# Patient Record
Sex: FEMALE | Employment: UNEMPLOYED | URBAN - NONMETROPOLITAN AREA
[De-identification: names, ages, dates, MRNs, and addresses within clinical notes are randomized per-mention and may not be internally consistent; named-entity substitution may affect disease eponyms.]

---

## 2017-05-03 ENCOUNTER — TELEPHONE (OUTPATIENT)
Dept: PULMONOLOGY | Age: 57
End: 2017-05-03

## 2017-10-17 ENCOUNTER — TELEPHONE (OUTPATIENT)
Dept: PULMONOLOGY | Age: 57
End: 2017-10-17

## 2018-03-29 ENCOUNTER — OFFICE VISIT (OUTPATIENT)
Dept: PULMONOLOGY | Age: 58
End: 2018-03-29
Payer: COMMERCIAL

## 2018-03-29 VITALS
TEMPERATURE: 99.4 F | WEIGHT: 265.4 LBS | HEIGHT: 64 IN | HEART RATE: 88 BPM | SYSTOLIC BLOOD PRESSURE: 138 MMHG | DIASTOLIC BLOOD PRESSURE: 80 MMHG | BODY MASS INDEX: 45.31 KG/M2 | OXYGEN SATURATION: 97 %

## 2018-03-29 DIAGNOSIS — G47.33 OSA ON CPAP: ICD-10-CM

## 2018-03-29 DIAGNOSIS — J30.89 NON-SEASONAL ALLERGIC RHINITIS DUE TO OTHER ALLERGIC TRIGGER, UNSPECIFIED CHRONICITY: Primary | ICD-10-CM

## 2018-03-29 DIAGNOSIS — Z99.89 OSA ON CPAP: ICD-10-CM

## 2018-03-29 PROCEDURE — 3014F SCREEN MAMMO DOC REV: CPT | Performed by: INTERNAL MEDICINE

## 2018-03-29 PROCEDURE — G8484 FLU IMMUNIZE NO ADMIN: HCPCS | Performed by: INTERNAL MEDICINE

## 2018-03-29 PROCEDURE — 1036F TOBACCO NON-USER: CPT | Performed by: INTERNAL MEDICINE

## 2018-03-29 PROCEDURE — 99214 OFFICE O/P EST MOD 30 MIN: CPT | Performed by: INTERNAL MEDICINE

## 2018-03-29 PROCEDURE — G8427 DOCREV CUR MEDS BY ELIG CLIN: HCPCS | Performed by: INTERNAL MEDICINE

## 2018-03-29 PROCEDURE — 3017F COLORECTAL CA SCREEN DOC REV: CPT | Performed by: INTERNAL MEDICINE

## 2018-03-29 PROCEDURE — G8419 CALC BMI OUT NRM PARAM NOF/U: HCPCS | Performed by: INTERNAL MEDICINE

## 2018-03-29 RX ORDER — FLUTICASONE PROPIONATE 50 MCG
2 SPRAY, SUSPENSION (ML) NASAL DAILY
Qty: 1 BOTTLE | Refills: 8 | Status: SHIPPED | OUTPATIENT
Start: 2018-03-29 | End: 2019-03-29

## 2018-03-29 NOTE — PROGRESS NOTES
 JOINT REPLACEMENT      x 2    TUBAL LIGATION      UPPER GASTROINTESTINAL ENDOSCOPY      2014       Social History   Substance Use Topics    Smoking status: Never Smoker    Smokeless tobacco: Never Used    Alcohol use No       No Known Allergies    Current Outpatient Prescriptions   Medication Sig Dispense Refill    HYDROCHLOROTHIAZIDE PO Take by mouth      NAPROXEN PO Take by mouth as needed      lisinopril (PRINIVIL;ZESTRIL) 40 MG tablet Take 40 mg by mouth daily      buPROPion (WELLBUTRIN) 75 MG tablet Take 75 mg by mouth 2 times daily      LORazepam (ATIVAN) 0.5 MG tablet Take 0.5 mg by mouth every 6 hours as needed for Anxiety      oxybutynin (DITROPAN) 5 MG tablet Take 5 mg by mouth 2 times daily      fluticasone (FLONASE) 50 MCG/ACT nasal spray 2 sprays by Nasal route daily 1 Bottle 6    etanercept (ENBREL) 50 MG/ML injection Inject 50 mg into the skin once Once a week      atenolol (TENORMIN) 25 MG tablet Take 25 mg by mouth 2 times daily.  montelukast (SINGULAIR) 10 MG tablet Take 10 mg by mouth nightly.  hydroxychloroquine (PLAQUENIL) 200 MG tablet Take 200 mg by mouth daily.  Cholecalciferol (VITAMIN D3) 08602 UNITS CAPS Take by mouth Once aweek      citalopram (CELEXA) 40 MG tablet Take 40 mg by mouth daily.  POTASSIUM CHLORIDE 10 mEq by Does not apply route 3 times daily.  hydrALAZINE (APRESOLINE) 10 MG tablet Take 100 mg by mouth 2 times daily       ranitidine (ZANTAC) 150 MG capsule Take 150 mg by mouth 2 times daily.  ferrous sulfate 325 (65 FE) MG tablet Take 325 mg by mouth 2 times daily       folic acid (FOLVITE) 1 MG tablet Take 1 mg by mouth daily.  traMADol (ULTRAM) 50 MG tablet Take 50 mg by mouth every 6 hours as needed for Pain      zolpidem (AMBIEN) 10 MG tablet Take 1 tablet by mouth nightly as needed for Sleep 30 tablet 5     No current facility-administered medications for this visit.         Family History   Problem Relation Age of 8hours 53minutes      Interface:      Provider:  []FELIX                 []Saad                [][employer]  [x][facility]                               [][facility]          []Other:          Assesment:  -Severe obstructive sleep apnea on treatment with a CPAP pressure of 19cm H20 on 2LPM of home O2 at night time - under good control with current therapy with significant improvement in clinical symptoms. She had excellent compliance for >4hours.  -Sleep onset and maintenance insomnia-improved. She quit taking [id number] PO Qhs.   -Allergic rhinitis on treatment with Flonase- tolerating well.  -Hx of Acute maxillary sinusitis S/p therapy with Augmentin in the past.  -Rheumatoid arthritis on treatment with Methotrexate. -S/p Left and Right knee replacement. Recommendations/Plan:  -She was advised to continue current positive airway pressure therapy with above described pressure along with home O2.  -Continue patient on Flonase 50mcg/INH 2sprays each nostril daily. She was given refills.  -She advised to keep good compliance with current recommended pressure to get optimal results and clinical improvement.  -She is following with Dr.Barbara Tim MD for her Anxiety disorder.  -Follow with my clinic in 16months for clinical reevaluation with review of download. -She was advised to call [employer] regarding supplies if needed.  -She call my office for earlier appointment if needed for worsening of sleep symptoms.  -She was advised to loose weight by controlling diet and doing exercise once cleared by her family physician.  -Jose Hunter educated about my impression and plan. Patient verbalizes understanding.

## 2020-07-23 ENCOUNTER — TELEPHONE (OUTPATIENT)
Dept: PULMONOLOGY | Age: 60
End: 2020-07-23

## 2021-12-05 NOTE — PROGRESS NOTES
[facility]   Date Of Service: 12/7/2021  Provider: Silvio Garcia DO, DO  Name: Ana Laura Stearns   MRN: [id number]    Chief Complaint(s)      Chief Complaint   Patient presents with    Established New Doctor     NEW PT RA        History of Present illness (HPI)    Ana Laura Stearns   is a(n)61 y.o. female with a hx of Anemia, rheumatoid arthritis, atrial fibrillation with RVR, bladder incontinence, depression, GERD, hypokalemia, hypermobility syndrome,  major depression disorder, migraine, morbid obesity, [place] on CPAP, osteoarthritis, tibial plateau fracture, uncontrolled depression, osteoporosis  referred by Shahriar Lindquist MD for evaluation of rheumatoid arthritis    Rheumatoid arthritis diagnosed in her 30's with increased joint pain and swelling in the feet. Sx's then progressed. Has been treated by Dr. Stefanie Cheng  ---> Dr. Shirin Santoyo at [address] in [place] until 2018. Prior medication  Plaquenil 200mg twice daily, methotrexate , Enbrel ( recurrent UTI, MRSA inection) , tramadol q6 hour, tylenol 3, percocet. Bilateral knee replacement for osteoarthritis - s/p recurrent left knee injection s/p space and then surgical revision. OSTEOARTHRITIS  Of the left hip w/ reported degeneration of the left femoral head and currently unable to weight bear. + deformities of the fingers progressively worsening since 2019. Off all rheumatoid arthritis medication since d/c [facility] home since April 2021. Currently followed by orthopedics, plastic surgery and infectious disease (Dr. Ari Cuevas)  of the wound infection and on minocycline daily. Patient with reported generalized joint pains of the bilateral upper and lower extremities, hips, shoulder. Pain up to 9.5/10 over the past we ek. Active swelling of the bilateral hands, wrists. Defromities of the fingers. Limited ROM of the wrist, elbows, fingers, bilateral shoulder.   Difficulty bathing, brushing teeth, toileting (using bed pan) Constant burning pain along the right lower elg. Current mediation: plaquenil , gabapentin 100mg three time daily,     H/o left leg fracture x 2 - once after jumping out of bet. , Left ankle fracture turning the ankle. On alendroante 70mg daily since Aug 11, 2021. Left femoral fracture. Allergies: Trimethoprim sulfamethoxazole (Bactrim): Hives,    Surgical history: Colonoscopy in 2014, hysterectomy 2010, right leg abscess I&D in 2007, laparoscopic appendectomy 2011, bilateral knee replacement, left knee revision 21, left femoral fracture repair in 2021 cholecystectomy, appendectomy    Family history: Brother pacemaker, father: Hypertension, prostate cancer, cardiac CAD, maternal grandmother: Colon cancer, mother: Arthritis, pacemaker, hypertension, CVA, cardiac CAD    Review of Systems    Review of Systems   Constitutional: Positive for fatigue. Negative for diaphoresis and fever. HENT: Negative for congestion, hearing loss and nosebleeds. Eyes: Negative. Negative for photophobia, pain and redness. Respiratory: Positive for cough (w/ occasional choking while in bed.). Negative for shortness of breath and wheezing. Cardiovascular: Positive for leg swelling. Negative for chest pain. Gastrointestinal: Negative for constipation, diarrhea, nausea and vomiting. Genitourinary: Negative for difficulty urinating, frequency and hematuria. Musculoskeletal: Positive for arthralgias and myalgias. Skin: Negative for rash. Neurological: Positive for weakness. Negative for dizziness and headaches. Hematological: Does not bruise/bleed easily. Psychiatric/Behavioral: Negative for sleep disturbance. The patient is not nervous/anxious. PAST MEDICAL HISTORY     has a past medical history of Allergic rhinitis, Anxiety, Arthritis, Cataract, Depression, Hypertension, Insomnia, [place] (obstructive sleep apnea), Osteoarthritis, Osteoporosis, and Urinary incontinence.      PAST SURGICAL HISTORY     has a past surgical history that includes joint replacement; Cholecystectomy; cyst removal (1992); Tubal ligation; joint replacement; Appendectomy; Ankle fracture surgery; Colonoscopy; and Upper gastrointestinal endoscopy. FAMILY HISTORY      Family History   Problem Relation Age of Onset    High Blood Pressure Father     Prostate Cancer Father     Colon Cancer Maternal Grandmother     Colon Polyps Maternal Grandmother     Other Brother         agent orange       SOCIAL HISTORY     reports that she has never smoked. She has never used smokeless tobacco. She reports that she does not drink alcohol and does not use drugs. ALLERGIES   No Known Allergies    CURRENT MEDICATIONS      Current Outpatient Medications:     HYDROCHLOROTHIAZIDE PO, Take by mouth, Disp: , Rfl:     fluticasone (FLONASE) 50 MCG/ACT nasal spray, 2 sprays by Nasal route daily, Disp: 1 Bottle, Rfl: 8    NAPROXEN PO, Take by mouth as needed, Disp: , Rfl:     traMADol (ULTRAM) 50 MG tablet, Take 50 mg by mouth every 6 hours as needed for Pain, Disp: , Rfl:     lisinopril (PRINIVIL;ZESTRIL) 40 MG tablet, Take 40 mg by mouth daily, Disp: , Rfl:     buPROPion (WELLBUTRIN) 75 MG tablet, Take 75 mg by mouth 2 times daily, Disp: , Rfl:     LORazepam (ATIVAN) 0.5 MG tablet, Take 0.5 mg by mouth every 6 hours as needed for Anxiety, Disp: , Rfl:     oxybutynin (DITROPAN) 5 MG tablet, Take 5 mg by mouth 2 times daily, Disp: , Rfl:     etanercept (ENBREL) 50 MG/ML injection, Inject 50 mg into the skin once Once a week, Disp: , Rfl:     atenolol (TENORMIN) 25 MG tablet, Take 25 mg by mouth 2 times daily. , Disp: , Rfl:     montelukast (SINGULAIR) 10 MG tablet, Take 10 mg by mouth nightly., Disp: , Rfl:     hydroxychloroquine (PLAQUENIL) 200 MG tablet, Take 200 mg by mouth daily. , Disp: , Rfl:     Cholecalciferol (VITAMIN D3) 66587 UNITS CAPS, Take by mouth Once aweek, Disp: , Rfl:     citalopram (CELEXA) 40 MG tablet, Take 40 mg by mouth daily., Disp: , Rfl:     POTASSIUM CHLORIDE, 10 mEq by Does not apply route 3 times daily. , Disp: , Rfl:     hydrALAZINE (APRESOLINE) 10 MG tablet, Take 100 mg by mouth 2 times daily , Disp: , Rfl:     ranitidine (ZANTAC) 150 MG capsule, Take 150 mg by mouth 2 times daily. , Disp: , Rfl:     ferrous sulfate 325 (65 FE) MG tablet, Take 325 mg by mouth 2 times daily , Disp: , Rfl:     folic acid (FOLVITE) 1 MG tablet, Take 1 mg by mouth daily. , Disp: , Rfl:     PHYSICAL EXAMINATION / OBJECTIVE     Objective:  /82 (Site: Left Lower Arm, Position: Sitting, Cuff Size: Medium Adult)   Pulse 110   Ht 5' 4.02\" (1.626 m)   Wt 260 lb (117.9 kg)   SpO2 98%   BMI 44.61 kg/m²     General Appearance:   alert and oriented to person, place and time well-developed and well nourished  Physch : appropriate affects ,   Head:  Normocephalic and atraumatic  Eyes: No gross abnormalities. ,  PERRL, Sclera nonicteric, conjunctiva non-INJECTED  ENT:  MMM,  no deformities , NO oral/nasal sores, Non-tender sinuses. Neck:  Neck supple, Non-tender, No  mass, thyromegaly,    Lymph:  No cervical  or  supraclavicular lymph node swelling. Pulmonary/Chest:  CTA bilat. , normal air movement, no respiratory distress  Cardiovascular:  Normal rate, + S1 and S2,  NO murmurs , rubs, gallups,      edema  :  Deferred   Abd/GI: Deferred   Neurologic:  gait and coordination normal and speech normal  Skin:  banadaged wound along the left knee. - scabbed lesions along the right anterio knee, right medial calf. Right anterior check. - hypertrophic changes w/  skin peeling along the soles of the feet,   Extremities:  No clubbing ,     Musculoskeletal:  Limited AROM - bilat shoulders, Wrists, fingers. Limited PROM Right wrist.    Strength 4/5 . Upper extremities:    SHOULDERS - inability to abduct, flexion, bilat shoulders. + tender bilat.  + swelling/warmth - Right anterior shoulder  ELBOWS  - tender bilat,   WRISTS  - tender / swelling bilat. + limited ROm of the Right wrist. ,   HANDS/FINGERS  Tender- MCPs, PIPs bilat. + synovitis of the MCPs, PIPs. + swan neck changes, ulnar deviation, MCP subluxation. Lower extremities:  ANKLES tender / swelling bilat. FEET : tender MTPs bilat. Swelling Right MTPs. Spine:  C-spine, T-spine & L-spine:  Limited ROM of the cervical spine, side bending. KEY:  Tender : T  Swelling: S  Non-tender : NT  No swelling: NS       RAPID 3  -- Composite Score MDHAQ (0-10) + Patient pain VAS (0-10): + Patient global assessment VAS (0-10):       Remission: <3  Low Disease Activity: <6  Moderate Disease Activity: >=6 and <=12  High Disease Activity: >12    LABS        CBC  No results found for: WBC, RBC, HGB, HCT, MCV, MCH, MCHC, RDW, PLT    CMP  No results found for: CALCIUM, LABALBU, PROT, NA, K, CO2, CL, BUN, CREATININE, ALKPHOS, ALT, AST    HgBA1c: No components found for: HGBA1C    No results found for: TSHFT4, TSH  No results found for: VITD25      No results found for: ANASCRN  No results found for: SSA  No results found for: SSB  No results found for: ANTI-SMITH  No results found for: DSDNAAB   No results found for: ANTIRNP  No results found for: C3, C4  No results found for: CCPAB  No results found for: RF    No components found for: CANCASCRN, APANCASCRN  No results found for: SEDRATE  No results found for: CRP    RADIOLOGY:     ELECTRODIAGNOSTIC CONSULTATION 2018     Alanis Kruse : 1960    Assessment   ASSESSMENT     Diagnoses  1. Tarsal tunnel syndrome, right   2. Tarsal tunnel syndrome, left   3. Mixed sensory-motor polyneuropathy     Discussion of Electrodiagnostic Findings    Needle EMG and the routine NCVs were performed in both legs. There was no evidence of a myopathy. There was no evidence of other radiculopathy or plexopathy. There was evidence of a mild sensory more than motor polyneuropathy.      There is a mild bilateral tarsal tunnel syndrome, involving the lateral plantar nerves. There was no evidence of other isolated nerve injury. Outside imaging:  Left femur: 6/25/2021: Advanced osteoarthritis left hip    DEXA: 6/8/2021 ---    Lumbar spine L1-4: BMD: 0.876 g/cm², T score: -1.6  Right hip (total) BMD: 0.567 g/cm², T score: -2.1  Right hip (neck), BMD: 0.414, T score: -3.9    Becky Guerrero MD - 09/12/2021   Formatting of this note might be different from the original.   9/11/2021 7:47 PM     EXAM: CT chest PE protocol. CLINICAL HISTORY: Chest pain, acute DVT. COMPARISON: CXR 03/14/21. TECHNIQUE:  Helical CT images of the chest were obtained at 2.5 mm collimation during the intravenous administration of 100 cc Isovue 370 contrast material. The timing of contrast is optimized for evaluation of the pulmonary arteries. Coronal MIPS and sagittal 2-D reformations are supplemented. Images are reviewed in both soft tissue and lung window settings. FINDINGS: Examination is technically adequate and diagnostic and there is no definite evidence of acute pulmonary embolus. The primary pulmonary segment is dilated and 4 cm, consistent with pulmonary arterial hypertension. There is some artifact involving lower lobe vessels. The thoracic aorta shows little atherosclerosis. Additionally, there is an anterior right subclavian artery takeoff from the thoracic aortic arch which has a retroesophageal course and no evidence of stenosis. The common carotid vessels arise from a common trunk from the thoracic aortic arch and the left subclavian artery arises from the arch. There is three-vessel coronary artery atherosclerosis. There is no pericardial or pleural effusion. There is an azygos fissure. There is little atelectasis or scar at the right lung base. Elsewhere, the lungs are clear. IMPRESSION:     1. No evidence of acute pulmonary embolus. 2. Pulmonary nodule hypertension. 3. Little atelectasis or scar at the right lung base. Carmen Burk MD - 03/26/2021   Formatting of this note might be different from the original.       XR-HIP LEFT 1 VIEW W/ PELVIS     Date Of Service: 3/26/2021 11:51 AM     Reason for study: AP Pelvis and Xtable lateral left hip please, History:  post op. Number of Series/Images:  2.     Comparison: None     Findings:   AP pelvis and lateral left hip demonstrate total femoral nail fixation of the proximal left femur with partially visualized lateral plate and screw fixation hardware transfixing previous noted subtrochanteric fracture. No displaced pelvic fracture noted. No lucency to suggest hardware malfunction     IMPRESSION:   1. Interval ORIF of the left femur as above     CT abdomen and pelvis findings:   Liver diffusely low density suggesting steatosis. No focal abnormality in the liver or spleen     Pancreas and adrenal glands within normal limits     Kidneys are symmetric     Abdominal aorta normal caliber and course     Bladder incompletely distended contains a Corral catheter. Uterus normal size     Linear density adjacent to the cecum likely indicates postoperative change from prior appendectomy is normal appendix is not apparent. Ileocecal junction appears within normal limits. No small or large bowel dilatation. No evidence of free air. Small fat-containing umbilical hernia without evidence of internal bowel     Evaluation of bone windows shows moderate right and advanced left osteoarthritis of the hips. There is a fracture of the proximal left femur intertrochanteric as on coronal image 76.  Levoscoliotic curvature of the lumbar spine     CT abdomen pelvis impression:   1.  Hepatic steatosis without definitive evidence of acute abdominal or pelvic abnormality   2.  2. Partial visualization of the known proximal left femoral fracture       X-rays pelvis and left hip: The pelvis itself seems balanced.  The right hip shows overgrowth of the superior lateral acetabular region but the head is otherwise rounded concentrically reduced with 2 to 3 mm of joint space. The left hip shows protrusio with a head migrating medially and deforming the central quadrilateral plate bone.  The head is flattened superiorly and medially.  There is absolutely no joint space in the weightbearing area of the hip joint.  There is subchondral sclerosis and subchondral cysts in the femoral head. Impression: End-stage arthritis of the left hip secondary to protrusio.  Right hip appears normal and the remainder of the pelvis and SI joints and bone density is age-appropriate. TTE: Findings:     ~Left Ventricle: Normal left ventricle size. Moderate left ventricular hypertrophy. Normal global systolic LV function. The ejection fraction is visually estimated to be 65 %. No regional wall motion abnormality noted. Grade 3 diastolic dysfunction (restrictive LV filling pattern).     ~Right Ventricle: Normal right ventricle size.     ~Left Atrium: Mild enlargement of the left atrium. Normal appearing atrial septum.     ~Right Atrium: Mild enlargement of the right atrium. Normal appearing atrial septum.     ~Mitral Valve: Normal mitral valve appearance and function.     ~Aortic Valve: Normal aortic valve appearance and function.     ~Tricuspid Valve: Normal tricuspid valve appearance and function.     ~Pulmonic Valve: Normal pulmonic valve appearance. Pulmonary valve not visualized.     ~Great Vessels: The aorta appears normal. The IVC is not visualized.     ~Pericardium: Trivial pericardial effusion.       Conclusions:     Moderate left ventricular hypertrophy. Normal global systolic LV function. The ejection fraction is visually estimated to be 65 %. Mild enlargement of the left atrium. Mild enlargement of the right atrium. No significant valvular abnormalities.       ASSESSMENT/PLAN      1. H/O rheumatoid arthritis    2. Osteoporosis, unspecified osteoporosis type, unspecified pathological fracture presence    3.  Osteoarthritis of multiple joints, unspecified osteoarthritis type    4. Tarsal tunnel syndrome, unspecified laterality    5. Open wound of left knee, sequela    6. Fatigue, unspecified type      1. H/O rheumatoid arthritis   -Patient with a known history of seropositive rheumatoid arthritis. Outside documentation. Previous therapies include methotrexate 17.5 mg weekly, Plaquenil 200 mg twice daily, Enbrel (recurrent MRSA infections). Off therapy now for the past several months since discharge from nursing home. Examination with diffuse synovitis (MCPs, PIPs, wrists, right shoulder, bilateral ankles), MCP subluxation, swan-neck changes bilateral MCPs,    -Requesting infectious disease/wound care notes prior to initiation of DMARD therapy to ensure there is no active infection at the skin graft site along the left knee. -Baseline TB and hepatitis screening for potential initiation of leflunomide versus methotrexate along with other biologic therapy such as Orencia.    -     CBC Auto Differential; Future  -     Comprehensive Metabolic Panel; Future  -     Sedimentation Rate; Future  -     C-Reactive Protein; Future  -     Hepatitis Panel, Acute; Future  -     Quantiferon TB Gold; Future  -     Rheumatoid Factor; Future  -     Cyclic Citrul Peptide Antibody, IgG; Future  -     Anti SSA; Future  -      Screen with Reflex; Future  -     Anti SSB; Future  -     XR HAND RIGHT (MIN 3 VIEWS); Future  -     XR HAND LEFT (MIN 3 VIEWS); Future  -     XR FOOT RIGHT (MIN 3 VIEWS); Future  -     XR FOOT LEFT (MIN 3 VIEWS); Future  -     XR CHEST (2 VW); Future  2. Osteoporosis, unspecified osteoporosis type, unspecified pathological fracture presence  -Previous femoral neck fracture requiring open reduction internal fixation. Request outside DEXA scan from [facility] in [place]. Continue alendronate 70 mg weekly (August 2021).     3. Osteoarthritis of multiple joints, unspecified osteoarthritis type  Prior to prescribing-any anti-inflammatories would like baseline renal functions. 4. Tarsal tunnel syndrome, unspecified laterality  -Prior EMG/NCV as above with bilateral tarsal tunnel syndrome.-Can be related to underlying rheumatoid arthritis and resultant compressive neuropathy    5. Open wound of left knee, sequela  -Request infectious disease, orthopedics and plastic surgery records. Active wound affecting the left knee and followed by wound care    6. Fatigue, unspecified type  -     CBC Auto Differential; Future  -     Comprehensive Metabolic Panel; Future  -     Sedimentation Rate; Future  -     C-Reactive Protein; Future  -     Hepatitis Panel, Acute; Future  -     Quantiferon TB Gold; Future  -     Rheumatoid Factor; Future  -     Cyclic Citrul Peptide Antibody, IgG; Future  -     Anti SSA; Future  -      Screen with Reflex; Future  -     Anti SSB; Future  -     XR HAND RIGHT (MIN 3 VIEWS); Future  -     XR HAND LEFT (MIN 3 VIEWS); Future  -     XR FOOT RIGHT (MIN 3 VIEWS); Future  -     XR FOOT LEFT (MIN 3 VIEWS); Future  -     XR CHEST (2 VW); Future    7. Lymphopenia/leukopenia  -As the patient is off methotrexate and other DMARD therapy with continued lymphopenia's will obtain baseline serologic evaluation for overlap connective tissue disease such as systemic lupus, Sjogren's syndrome or other. This can be a manifestation of rheumatoid arthritis such as Felty syndrome T-LGL. -Repeat CBC has been ordered  -     ANTI-RNP (KRISTEL AB); Future  -     Monique (KRISTEL) Antibody IgG; Future  -     Anti-DNA Antibody, Double-Stranded; Future    8. History ofsubstance abuse. -UDS per Care Everywhere from march 23, 2021. Revealing opiate and amphetamine use    Return in about 6 weeks (around 1/18/2022).     Electronically signed by Maria Dolores Draper DO on 12/5/2021 at 9:17 AM    New Prescriptions    No medications on file       12/5/2021       The risks and benefits of my recommendations, as well as other treatment options, benefits and side effects were discussed with the patient today. Questions were answered. Thank you for allowing me to participate in the care of this patient. Please call if there are any questions.

## 2021-12-07 ENCOUNTER — OFFICE VISIT (OUTPATIENT)
Dept: RHEUMATOLOGY | Age: 61
End: 2021-12-07
Payer: COMMERCIAL

## 2021-12-07 VITALS
SYSTOLIC BLOOD PRESSURE: 132 MMHG | HEIGHT: 64 IN | WEIGHT: 260 LBS | OXYGEN SATURATION: 98 % | DIASTOLIC BLOOD PRESSURE: 82 MMHG | HEART RATE: 110 BPM | BODY MASS INDEX: 44.39 KG/M2

## 2021-12-07 DIAGNOSIS — G57.50 TARSAL TUNNEL SYNDROME, UNSPECIFIED LATERALITY: ICD-10-CM

## 2021-12-07 DIAGNOSIS — Z87.39 H/O RHEUMATOID ARTHRITIS: Primary | ICD-10-CM

## 2021-12-07 DIAGNOSIS — M81.0 OSTEOPOROSIS, UNSPECIFIED OSTEOPOROSIS TYPE, UNSPECIFIED PATHOLOGICAL FRACTURE PRESENCE: ICD-10-CM

## 2021-12-07 DIAGNOSIS — R53.83 FATIGUE, UNSPECIFIED TYPE: ICD-10-CM

## 2021-12-07 DIAGNOSIS — D72.810 LYMPHOPENIA: ICD-10-CM

## 2021-12-07 DIAGNOSIS — S81.002S OPEN WOUND OF LEFT KNEE, SEQUELA: ICD-10-CM

## 2021-12-07 DIAGNOSIS — M15.9 OSTEOARTHRITIS OF MULTIPLE JOINTS, UNSPECIFIED OSTEOARTHRITIS TYPE: ICD-10-CM

## 2021-12-07 PROBLEM — F41.9 ANXIETY DISORDER, UNSPECIFIED: Status: ACTIVE | Noted: 2020-09-20

## 2021-12-07 PROBLEM — F32.A DEPRESSION: Status: ACTIVE | Noted: 2020-09-20

## 2021-12-07 PROBLEM — I71.9 AORTIC ANEURYSM, DESCENDING (HCC): Status: ACTIVE | Noted: 2021-03-23

## 2021-12-07 PROBLEM — E55.9 VITAMIN D INSUFFICIENCY: Status: ACTIVE | Noted: 2021-03-23

## 2021-12-07 PROBLEM — Z96.653 HISTORY OF BILATERAL KNEE REPLACEMENT: Status: ACTIVE | Noted: 2019-11-04

## 2021-12-07 PROBLEM — G57.52 TARSAL TUNNEL SYNDROME, LEFT: Status: ACTIVE | Noted: 2018-04-09

## 2021-12-07 PROBLEM — G47.34 HYPOXIA, SLEEP RELATED: Status: ACTIVE | Noted: 2021-01-19

## 2021-12-07 PROBLEM — Z96.651 S/P REVISION OF TOTAL KNEE, RIGHT: Status: ACTIVE | Noted: 2020-09-04

## 2021-12-07 PROBLEM — M35.7 BENIGN HYPERMOBILITY SYNDROME: Status: ACTIVE | Noted: 2019-01-15

## 2021-12-07 PROBLEM — G60.8 MIXED SENSORY-MOTOR POLYNEUROPATHY: Status: ACTIVE | Noted: 2018-04-09

## 2021-12-07 PROBLEM — F15.10 AMPHETAMINE ABUSE (HCC): Status: ACTIVE | Noted: 2021-03-23

## 2021-12-07 PROBLEM — N32.81 OAB (OVERACTIVE BLADDER): Status: ACTIVE | Noted: 2020-09-20

## 2021-12-07 PROBLEM — M00.80 BACTERIAL ARTHRITIS (HCC): Status: ACTIVE | Noted: 2020-01-16

## 2021-12-07 PROBLEM — I10 HYPERTENSION, ESSENTIAL: Status: ACTIVE | Noted: 2020-09-20

## 2021-12-07 PROBLEM — K21.9 GASTROESOPHAGEAL REFLUX DISEASE WITHOUT ESOPHAGITIS: Status: ACTIVE | Noted: 2020-09-20

## 2021-12-07 PROBLEM — M06.9 RHEUMATOID ARTHRITIS INVOLVING MULTIPLE JOINTS (HCC): Status: ACTIVE | Noted: 2018-06-14

## 2021-12-07 PROCEDURE — G8417 CALC BMI ABV UP PARAM F/U: HCPCS | Performed by: INTERNAL MEDICINE

## 2021-12-07 PROCEDURE — G8427 DOCREV CUR MEDS BY ELIG CLIN: HCPCS | Performed by: INTERNAL MEDICINE

## 2021-12-07 PROCEDURE — G8484 FLU IMMUNIZE NO ADMIN: HCPCS | Performed by: INTERNAL MEDICINE

## 2021-12-07 PROCEDURE — 99245 OFF/OP CONSLTJ NEW/EST HI 55: CPT | Performed by: INTERNAL MEDICINE

## 2021-12-07 RX ORDER — BUSPIRONE HYDROCHLORIDE 15 MG/1
1 TABLET ORAL DAILY
COMMUNITY

## 2021-12-07 RX ORDER — AMLODIPINE BESYLATE 2.5 MG/1
1 TABLET ORAL DAILY
COMMUNITY
Start: 2021-01-19

## 2021-12-07 RX ORDER — GABAPENTIN 100 MG/1
1 CAPSULE ORAL 2 TIMES DAILY
COMMUNITY
Start: 2021-09-12

## 2021-12-07 ASSESSMENT — ENCOUNTER SYMPTOMS
EYE REDNESS: 0
WHEEZING: 0
COUGH: 1
CONSTIPATION: 0
PHOTOPHOBIA: 0
DIARRHEA: 0
VOMITING: 0
EYES NEGATIVE: 1
SHORTNESS OF BREATH: 0
EYE PAIN: 0
NAUSEA: 0

## 2021-12-10 LAB
ALBUMIN SERPL-MCNC: 2.5 G/DL
ALP BLD-CCNC: 93 U/L
ALT SERPL-CCNC: 12 U/L
ANION GAP SERPL CALCULATED.3IONS-SCNC: 11 MMOL/L
AST SERPL-CCNC: 13 U/L
BASOPHILS ABSOLUTE: 0.02 /ΜL
BASOPHILS RELATIVE PERCENT: 0.6 %
BILIRUB SERPL-MCNC: 0.2 MG/DL (ref 0.1–1.4)
BUN BLDV-MCNC: 14 MG/DL
C-REACTIVE PROTEIN: 9.9
CALCIUM SERPL-MCNC: 8.8 MG/DL
CHLORIDE BLD-SCNC: 108 MMOL/L
CO2: 27 MMOL/L
CREAT SERPL-MCNC: 0.8 MG/DL
EOSINOPHILS ABSOLUTE: 0.21 /ΜL
EOSINOPHILS RELATIVE PERCENT: 5.8 %
GFR CALCULATED: 77
GLUCOSE BLD-MCNC: 99 MG/DL
HCT VFR BLD CALC: 34.9 % (ref 36–46)
HEMOGLOBIN: 10.9 G/DL (ref 12–16)
LYMPHOCYTES ABSOLUTE: 0.58 /ΜL
LYMPHOCYTES RELATIVE PERCENT: 16.1 %
MCH RBC QN AUTO: 29.7 PG
MCHC RBC AUTO-ENTMCNC: 31.2 G/DL
MCV RBC AUTO: 95 FL
MONOCYTES ABSOLUTE: 0.53 /ΜL
MONOCYTES RELATIVE PERCENT: 14.7 %
NEUTROPHILS ABSOLUTE: 2.25 /ΜL
NEUTROPHILS RELATIVE PERCENT: 62.5 %
PDW BLD-RTO: 52.6 %
PLATELET # BLD: 308 K/ΜL
PMV BLD AUTO: 10.7 FL
POTASSIUM SERPL-SCNC: 3.9 MMOL/L
RBC # BLD: 3.67 10^6/ΜL
RHEUMATOID FACTOR: 650
SEDIMENTATION RATE, ERYTHROCYTE: 55
SODIUM BLD-SCNC: 142 MMOL/L
TOTAL PROTEIN: NORMAL
WBC # BLD: 3.6 10^3/ML

## 2022-01-04 DIAGNOSIS — Z87.39 H/O RHEUMATOID ARTHRITIS: ICD-10-CM

## 2022-01-04 DIAGNOSIS — R53.83 FATIGUE, UNSPECIFIED TYPE: ICD-10-CM

## 2022-01-11 ENCOUNTER — TELEPHONE (OUTPATIENT)
Dept: RHEUMATOLOGY | Age: 62
End: 2022-01-11

## 2022-01-12 ENCOUNTER — NURSE ONLY (OUTPATIENT)
Dept: LAB | Age: 62
End: 2022-01-12

## 2022-01-12 ENCOUNTER — TELEPHONE (OUTPATIENT)
Dept: RHEUMATOLOGY | Age: 62
End: 2022-01-12

## 2022-01-12 DIAGNOSIS — R53.83 FATIGUE, UNSPECIFIED TYPE: ICD-10-CM

## 2022-01-12 DIAGNOSIS — Z87.39 H/O RHEUMATOID ARTHRITIS: ICD-10-CM

## 2022-01-12 NOTE — TELEPHONE ENCOUNTER
Notes from Dr. Melissa Blum on 11/2021 with active open wound - in care everywhere. Patient scheduled to be seen today--- will evaluate and discuss.

## 2022-01-12 NOTE — TELEPHONE ENCOUNTER
Spoke to Juan regarding wound care/infection dr. Walsh was a little confused but believes it is Dr Merlinda Laroche

## 2022-01-16 LAB
QUANTI TB GOLD PLUS: NEGATIVE
QUANTI TB1 MINUS NIL: 0.03 IU/ML (ref 0–0.34)
QUANTI TB2 MINUS NIL: 0.04 IU/ML (ref 0–0.34)
QUANTIFERON MITOGEN MINUS NIL: 9.45 IU/ML
QUANTIFERON NIL: 0.04 IU/ML

## 2022-02-07 ENCOUNTER — OFFICE VISIT (OUTPATIENT)
Dept: RHEUMATOLOGY | Age: 62
End: 2022-02-07
Payer: COMMERCIAL

## 2022-02-07 VITALS
OXYGEN SATURATION: 96 % | HEIGHT: 64 IN | WEIGHT: 260 LBS | SYSTOLIC BLOOD PRESSURE: 130 MMHG | DIASTOLIC BLOOD PRESSURE: 80 MMHG | BODY MASS INDEX: 44.39 KG/M2 | HEART RATE: 74 BPM

## 2022-02-07 DIAGNOSIS — M05.9 SEROPOSITIVE RHEUMATOID ARTHRITIS (HCC): Primary | ICD-10-CM

## 2022-02-07 DIAGNOSIS — S81.002S OPEN WOUND OF LEFT KNEE, SEQUELA: ICD-10-CM

## 2022-02-07 DIAGNOSIS — M81.0 OSTEOPOROSIS, UNSPECIFIED OSTEOPOROSIS TYPE, UNSPECIFIED PATHOLOGICAL FRACTURE PRESENCE: ICD-10-CM

## 2022-02-07 DIAGNOSIS — M15.9 OSTEOARTHRITIS OF MULTIPLE JOINTS, UNSPECIFIED OSTEOARTHRITIS TYPE: ICD-10-CM

## 2022-02-07 DIAGNOSIS — G57.50 TARSAL TUNNEL SYNDROME, UNSPECIFIED LATERALITY: ICD-10-CM

## 2022-02-07 PROCEDURE — 99214 OFFICE O/P EST MOD 30 MIN: CPT | Performed by: NURSE PRACTITIONER

## 2022-02-07 PROCEDURE — G8417 CALC BMI ABV UP PARAM F/U: HCPCS | Performed by: NURSE PRACTITIONER

## 2022-02-07 PROCEDURE — G8427 DOCREV CUR MEDS BY ELIG CLIN: HCPCS | Performed by: NURSE PRACTITIONER

## 2022-02-07 PROCEDURE — 1036F TOBACCO NON-USER: CPT | Performed by: NURSE PRACTITIONER

## 2022-02-07 PROCEDURE — 3017F COLORECTAL CA SCREEN DOC REV: CPT | Performed by: NURSE PRACTITIONER

## 2022-02-07 PROCEDURE — G8484 FLU IMMUNIZE NO ADMIN: HCPCS | Performed by: NURSE PRACTITIONER

## 2022-02-07 ASSESSMENT — ENCOUNTER SYMPTOMS
DIARRHEA: 0
BACK PAIN: 0
ABDOMINAL PAIN: 0
SHORTNESS OF BREATH: 0
TROUBLE SWALLOWING: 0
EYE PAIN: 0
CONSTIPATION: 1
COUGH: 0
NAUSEA: 0
EYE ITCHING: 0

## 2022-02-07 NOTE — PROGRESS NOTES
dizziness, weakness, numbness and headaches. Psychiatric/Behavioral: The patient is not nervous/anxious. PAST MEDICAL HISTORY      Past Medical History:   Diagnosis Date    Allergic rhinitis     Anxiety     Arthritis     Cataract     Depression     Hypertension     Insomnia     [place] (obstructive sleep apnea)     Osteoarthritis     Osteoporosis     Urinary incontinence        PAST SURGICAL HISTORY      Past Surgical History:   Procedure Laterality Date    ANKLE FRACTURE SURGERY      hardware placed    APPENDECTOMY      CHOLECYSTECTOMY      COLONOSCOPY      2014    CYST REMOVAL  1992    JOINT REPLACEMENT      rt    JOINT REPLACEMENT      x 2    TUBAL LIGATION      UPPER GASTROINTESTINAL ENDOSCOPY      2014       FAMILY HISTORY      Family History   Problem Relation Age of Onset    High Blood Pressure Father     Prostate Cancer Father     Colon Cancer Maternal Grandmother     Colon Polyps Maternal Grandmother     Other Brother         agent orange       SOCIAL HISTORY      Social History     Tobacco History     Smoking Status  Never Smoker    Smokeless Tobacco Use  Never Used          Alcohol History     Alcohol Use Status  No          Drug Use     Drug Use Status  No          Sexual Activity     Sexually Active  Not Asked                ALLERGIES     Allergies   Allergen Reactions    Sulfamethoxazole-Trimethoprim Hives       CURRENT MEDICATIONS      Current Outpatient Medications   Medication Sig Dispense Refill    amLODIPine (NORVASC) 2.5 MG tablet Take 1 tablet by mouth daily      busPIRone (BUSPAR) 15 MG tablet Take 1 tablet by mouth daily      gabapentin (NEURONTIN) 100 MG capsule Take 1 capsule by mouth 2 times daily.       methotrexate (RHEUMATREX) 2.5 MG chemo tablet Take 1 tablet by mouth every 7 days (Patient not taking: Reported on 12/7/2021)      fluticasone (FLONASE) 50 MCG/ACT nasal spray 2 sprays by Nasal route daily 1 Bottle 8    buPROPion (WELLBUTRIN) 75 MG tablet Take 75 mg by mouth 2 times daily      oxybutynin (DITROPAN) 5 MG tablet Take 5 mg by mouth 2 times daily      etanercept (ENBREL) 50 MG/ML injection Inject 50 mg into the skin once Once a week (Patient not taking: Reported on 12/7/2021)      atenolol (TENORMIN) 25 MG tablet Take 25 mg by mouth 2 times daily. (Patient not taking: Reported on 12/7/2021)      hydroxychloroquine (PLAQUENIL) 200 MG tablet Take 200 mg by mouth daily.  Cholecalciferol (VITAMIN D3) 30184 UNITS CAPS Take by mouth Once aweek (Patient not taking: Reported on 12/7/2021)      citalopram (CELEXA) 40 MG tablet Take 40 mg by mouth daily. (Patient not taking: Reported on 12/7/2021)      ranitidine (ZANTAC) 150 MG capsule Take 150 mg by mouth 2 times daily. (Patient not taking: Reported on 12/7/2021)      ferrous sulfate 325 (65 FE) MG tablet Take 325 mg by mouth 2 times daily       folic acid (FOLVITE) 1 MG tablet Take 1 mg by mouth daily. No current facility-administered medications for this visit. PHYSICAL EXAMINATION / OBJECTIVE   Objective:  Ht 5' 4.02\" (1.626 m)   BMI 44.61 kg/m²     Physical Exam  Vitals reviewed. Constitutional:       Appearance: She is well-developed. Cardiovascular:      Rate and Rhythm: Normal rate and regular rhythm. Pulmonary:      Effort: Pulmonary effort is normal.      Breath sounds: Normal breath sounds. Musculoskeletal:      Cervical back: Normal range of motion and neck supple. Skin:     General: Skin is warm and dry. Findings: No rash. Neurological:      Mental Status: She is alert and oriented to person, place, and time. Psychiatric:         Thought Content: Thought content normal.       Upper extremities:    SHOULDERS + limited ROM bilat. Tender bilat. + swelling right  ELBOWS tender bilat,   WRISTS tender and swelling bilat,   HANDS/FINGERS tender and swelling bilat MCPs and PIPs.  + swan neck changes, ulnar deviation, MCP subluxation    Lower extremities:  ANKLES tender and swelling bilat   FEET : tender bilat MTPs, + swelling right        LABS    CBC  Lab Results   Component Value Date    WBC 3.60 12/10/2021    RBC 3.67 12/10/2021    HGB 10.9 12/10/2021    HCT 34.9 12/10/2021    MCV 95.0 12/10/2021    MCH 29.7 12/10/2021    MCHC 31.2 12/10/2021    RDW 52.6 12/10/2021     12/10/2021       CMP  Lab Results   Component Value Date    CALCIUM 8.8 12/10/2021    LABALBU 2.5 12/10/2021     12/10/2021    K 3.9 12/10/2021    CO2 27 12/10/2021     12/10/2021    BUN 14 12/10/2021    CREATININE 0.8 12/10/2021    ALKPHOS 93 12/10/2021    ALT 12 12/10/2021    AST 13 12/10/2021       HgBA1c: No components found for: HGBA1C    No results found for: VITD25      No results found for: ANASCRN  No results found for: SSA  No results found for: SSB  No results found for: ANTI-SMITH  No results found for: DSDNAAB   No results found for: ANTIRNP  No results found for: C3, C4  No results found for: CCPAB  Lab Results   Component Value Date     12/10/2021       No components found for: CANCASCRN, APANCASCRN  Lab Results   Component Value Date    SEDRATE 55 12/10/2021     Lab Results   Component Value Date    CRP 9.9 12/10/2021       RADIOLOGY:        DEXA: 6/8/2021 ---    Lumbar spine L1-4: BMD: 0.876 g/cm², T score: -1.6  Right hip (total) BMD: 0.567 g/cm², T score: -2.1  Right hip (neck), BMD: 0.414, T score: -3.9    ASSESSMENT/PLAN    Assessment   Plan     Seropositive rheumatoid arthritis + RF > 650, +   - prior tx: methotrexate 17.5 mg weekly, plaquenil 200 mg BID, enbrel (recurrent MRSA infections). - would like to start methotrexate- requesting clearance from infectious disease and plastic surgeon prior to starting. - left messages with Dr. Xiao Hdez and Dr. Yaneth Overton offices    Osteoporosis  - prior femoral neck fracture. DEXA 6/2021 as above.     - alendronate 70 mg weekly    Osteoarthritis of multiple joints   - continue excedrin PRN    Tarsal tunnel syndrome  - prior EMG- can be related to #1    Open wound of left knee- active    Leukopenia/lymphopenia  - neg CTD evaluation. Can be related to rheumatoid arthritis (felty, T-LGL)    H/o substance abuse  - UDS in care everywhere w/ opiate and amphetamine use      No follow-ups on file. Electronically signed by CAROLINA Parker CNP on 2/7/2022 at 10:27 AM    New Prescriptions    No medications on file       Thank you for allowing me to participate in the care of this patient. Please call if there are any questions.

## 2022-02-10 ENCOUNTER — TELEPHONE (OUTPATIENT)
Dept: RHEUMATOLOGY | Age: 62
End: 2022-02-10

## 2022-02-10 DIAGNOSIS — M05.9 SEROPOSITIVE RHEUMATOID ARTHRITIS (HCC): Primary | ICD-10-CM

## 2022-02-10 DIAGNOSIS — Z51.81 MEDICATION MONITORING ENCOUNTER: ICD-10-CM

## 2022-02-10 DIAGNOSIS — M05.9 SEROPOSITIVE RHEUMATOID ARTHRITIS (HCC): ICD-10-CM

## 2022-02-10 RX ORDER — FOLIC ACID 1 MG/1
1 TABLET ORAL DAILY
Qty: 90 TABLET | Refills: 1 | Status: SHIPPED | OUTPATIENT
Start: 2022-02-10

## 2022-02-10 NOTE — TELEPHONE ENCOUNTER
Diagnosis Orders   1. Seropositive rheumatoid arthritis (HCC)  methotrexate (RHEUMATREX) 2.5 MG chemo tablet    folic acid (FOLVITE) 1 MG tablet   2. Medication monitoring encounter  CBC Auto Differential    Comprehensive Metabolic Panel    C-Reactive Protein    Sedimentation Rate     - would like to restart Methotrexate 70.2WW weekly with folic acid 1mg daily   - repeat labs every 4 weeks.

## 2022-02-10 NOTE — PROGRESS NOTES
1. Seropositive rheumatoid arthritis (HCC)  - methotrexate (RHEUMATREX) 2.5 MG chemo tablet; Take 7 tablets by mouth once a week Take 4 tablets in the morning and 3 tablets in the evening  Dispense: 28 tablet;  Refill: 0

## 2022-03-24 ENCOUNTER — NURSE ONLY (OUTPATIENT)
Dept: LAB | Age: 62
End: 2022-03-24

## 2022-03-24 ENCOUNTER — OFFICE VISIT (OUTPATIENT)
Dept: RHEUMATOLOGY | Age: 62
End: 2022-03-24
Payer: COMMERCIAL

## 2022-03-24 VITALS
HEART RATE: 82 BPM | HEIGHT: 64 IN | OXYGEN SATURATION: 93 % | WEIGHT: 260 LBS | SYSTOLIC BLOOD PRESSURE: 118 MMHG | BODY MASS INDEX: 44.39 KG/M2 | DIASTOLIC BLOOD PRESSURE: 78 MMHG

## 2022-03-24 DIAGNOSIS — M05.9 SEROPOSITIVE RHEUMATOID ARTHRITIS (HCC): ICD-10-CM

## 2022-03-24 DIAGNOSIS — M81.0 OSTEOPOROSIS, UNSPECIFIED OSTEOPOROSIS TYPE, UNSPECIFIED PATHOLOGICAL FRACTURE PRESENCE: ICD-10-CM

## 2022-03-24 DIAGNOSIS — D72.810 LYMPHOPENIA: ICD-10-CM

## 2022-03-24 DIAGNOSIS — Z51.81 MEDICATION MONITORING ENCOUNTER: ICD-10-CM

## 2022-03-24 DIAGNOSIS — G57.50 TARSAL TUNNEL SYNDROME, UNSPECIFIED LATERALITY: ICD-10-CM

## 2022-03-24 DIAGNOSIS — S81.002S OPEN WOUND OF LEFT KNEE, SEQUELA: ICD-10-CM

## 2022-03-24 DIAGNOSIS — M05.9 SEROPOSITIVE RHEUMATOID ARTHRITIS (HCC): Primary | ICD-10-CM

## 2022-03-24 DIAGNOSIS — M15.9 OSTEOARTHRITIS OF MULTIPLE JOINTS, UNSPECIFIED OSTEOARTHRITIS TYPE: ICD-10-CM

## 2022-03-24 LAB
ALBUMIN SERPL-MCNC: 3.7 G/DL (ref 3.5–5.1)
ALP BLD-CCNC: 106 U/L (ref 38–126)
ALT SERPL-CCNC: 7 U/L (ref 11–66)
ANION GAP SERPL CALCULATED.3IONS-SCNC: 13 MEQ/L (ref 8–16)
AST SERPL-CCNC: 10 U/L (ref 5–40)
BASOPHILS # BLD: 0.7 %
BASOPHILS ABSOLUTE: 0 THOU/MM3 (ref 0–0.1)
BILIRUB SERPL-MCNC: 0.4 MG/DL (ref 0.3–1.2)
BUN BLDV-MCNC: 20 MG/DL (ref 7–22)
C-REACTIVE PROTEIN: 1.52 MG/DL (ref 0–1)
CALCIUM SERPL-MCNC: 10 MG/DL (ref 8.5–10.5)
CHLORIDE BLD-SCNC: 105 MEQ/L (ref 98–111)
CO2: 24 MEQ/L (ref 23–33)
CREAT SERPL-MCNC: 0.8 MG/DL (ref 0.4–1.2)
EOSINOPHIL # BLD: 3.9 %
EOSINOPHILS ABSOLUTE: 0.2 THOU/MM3 (ref 0–0.4)
ERYTHROCYTE [DISTWIDTH] IN BLOOD BY AUTOMATED COUNT: 14 % (ref 11.5–14.5)
ERYTHROCYTE [DISTWIDTH] IN BLOOD BY AUTOMATED COUNT: 50.5 FL (ref 35–45)
GFR SERPL CREATININE-BSD FRML MDRD: 72 ML/MIN/1.73M2
GLUCOSE BLD-MCNC: 109 MG/DL (ref 70–108)
HCT VFR BLD CALC: 44.6 % (ref 37–47)
HEMOGLOBIN: 13.9 GM/DL (ref 12–16)
IMMATURE GRANS (ABS): 0.02 THOU/MM3 (ref 0–0.07)
IMMATURE GRANULOCYTES: 0.4 %
LYMPHOCYTES # BLD: 13.2 %
LYMPHOCYTES ABSOLUTE: 0.8 THOU/MM3 (ref 1–4.8)
MCH RBC QN AUTO: 30.5 PG (ref 26–33)
MCHC RBC AUTO-ENTMCNC: 31.2 GM/DL (ref 32.2–35.5)
MCV RBC AUTO: 98 FL (ref 81–99)
MONOCYTES # BLD: 11.8 %
MONOCYTES ABSOLUTE: 0.7 THOU/MM3 (ref 0.4–1.3)
NUCLEATED RED BLOOD CELLS: 0 /100 WBC
PLATELET # BLD: 359 THOU/MM3 (ref 130–400)
PMV BLD AUTO: 10.6 FL (ref 9.4–12.4)
POTASSIUM SERPL-SCNC: 4 MEQ/L (ref 3.5–5.2)
RBC # BLD: 4.55 MILL/MM3 (ref 4.2–5.4)
SEDIMENTATION RATE, ERYTHROCYTE: 66 MM/HR (ref 0–20)
SEG NEUTROPHILS: 70 %
SEGMENTED NEUTROPHILS ABSOLUTE COUNT: 4 THOU/MM3 (ref 1.8–7.7)
SODIUM BLD-SCNC: 142 MEQ/L (ref 135–145)
TOTAL PROTEIN: 7 G/DL (ref 6.1–8)
WBC # BLD: 5.7 THOU/MM3 (ref 4.8–10.8)

## 2022-03-24 PROCEDURE — 99214 OFFICE O/P EST MOD 30 MIN: CPT | Performed by: NURSE PRACTITIONER

## 2022-03-24 PROCEDURE — 3017F COLORECTAL CA SCREEN DOC REV: CPT | Performed by: NURSE PRACTITIONER

## 2022-03-24 PROCEDURE — G8484 FLU IMMUNIZE NO ADMIN: HCPCS | Performed by: NURSE PRACTITIONER

## 2022-03-24 PROCEDURE — G8417 CALC BMI ABV UP PARAM F/U: HCPCS | Performed by: NURSE PRACTITIONER

## 2022-03-24 PROCEDURE — 1036F TOBACCO NON-USER: CPT | Performed by: NURSE PRACTITIONER

## 2022-03-24 PROCEDURE — G8427 DOCREV CUR MEDS BY ELIG CLIN: HCPCS | Performed by: NURSE PRACTITIONER

## 2022-03-24 ASSESSMENT — ENCOUNTER SYMPTOMS
BACK PAIN: 1
CONSTIPATION: 0
SHORTNESS OF BREATH: 0
TROUBLE SWALLOWING: 0
EYE ITCHING: 0
COUGH: 0
NAUSEA: 0
EYE PAIN: 0
ABDOMINAL PAIN: 0
DIARRHEA: 0

## 2022-03-24 NOTE — PROGRESS NOTES
[facility] RHEUMATOLOGY FOLLOW UP NOTE       Date Of Service: 3/24/2022  Provider: CAROLINA Roche - CNP    Name: Husam Olson   MRN: [id number]    Chief Complaint(s)     Chief Complaint   Patient presents with    Follow-up     6 week f/u        History of Present Illness (HPI)     Husam Olson  is a(n)62 y.o. female with a hx of anemia, rheumatoid arthritis, atrial fibrillation with RVR, bladder incontinence, depression, GERD, hypokalemia, hypermobility syndrome, major depression disorder, migraines morbid obesity, [place] on CPAP, osteoarthritis, tibial plateau fracture, osteoporosis  here for the f/u evaluation of rheumatoid arthritis     Interval hx:    - off methotrexate- did not get labs done   - doing therapy which is helping with overall mobility    pain affecting the fingers, wrists, elbows, shoulders, hips, knees, ankles, toes, neck, back  Pain on a scale 0-10: 10/10  Type of pain: constant  Timing: mornings  Aggravating factors: touching the joints  Alleviating factors: excedrin    Associated symptoms:  denies swelling/  Redness/ warmth, + AM stiffness lasting ~ several hours    Continued to follow with Dr. Fallon Robles in [place] (infectious disease) for open wound    REVIEW OF SYSTEMS: (ROS)    Review of Systems   Constitutional: Positive for fatigue. Negative for fever and unexpected weight change. HENT: Negative for congestion and trouble swallowing. Eyes: Negative for pain and itching. Dry eyes   Respiratory: Negative for cough and shortness of breath. Cardiovascular: Negative for chest pain and leg swelling. Gastrointestinal: Negative for abdominal pain, constipation, diarrhea and nausea. Endocrine: Negative for cold intolerance and heat intolerance. Genitourinary: Negative for difficulty urinating, frequency and urgency. Musculoskeletal: Positive for arthralgias, back pain, myalgias and neck pain. Negative for joint swelling. Skin: Positive for wound. Negative for rash. Neurological: Positive for weakness and headaches. Negative for dizziness and numbness. Psychiatric/Behavioral: The patient is nervous/anxious.         PAST MEDICAL HISTORY      Past Medical History:   Diagnosis Date    Allergic rhinitis     Anxiety     Arthritis     Cataract     Depression     Hypertension     Insomnia     [place] (obstructive sleep apnea)     Osteoarthritis     Osteoporosis     Urinary incontinence        PAST SURGICAL HISTORY      Past Surgical History:   Procedure Laterality Date    ANKLE FRACTURE SURGERY      hardware placed    APPENDECTOMY      CHOLECYSTECTOMY      COLONOSCOPY      2014    CYST REMOVAL  1992    JOINT REPLACEMENT      rt    JOINT REPLACEMENT      x 2    TUBAL LIGATION      UPPER GASTROINTESTINAL ENDOSCOPY      2014       FAMILY HISTORY      Family History   Problem Relation Age of Onset    High Blood Pressure Father     Prostate Cancer Father     Colon Cancer Maternal Grandmother     Colon Polyps Maternal Grandmother     Other Brother         agent orange       SOCIAL HISTORY      Social History     Tobacco History     Smoking Status  Never Smoker    Smokeless Tobacco Use  Never Used          Alcohol History     Alcohol Use Status  No          Drug Use     Drug Use Status  No          Sexual Activity     Sexually Active  Not Asked                ALLERGIES     Allergies   Allergen Reactions    Sulfamethoxazole-Trimethoprim Hives       CURRENT MEDICATIONS      Current Outpatient Medications   Medication Sig Dispense Refill    folic acid (FOLVITE) 1 MG tablet Take 1 tablet by mouth daily 90 tablet 1    methotrexate (RHEUMATREX) 2.5 MG chemo tablet Take 7 tablets by mouth once a week Take 4 tablets in the morning and 3 tablets in the evening 28 tablet 0    amLODIPine (NORVASC) 2.5 MG tablet Take 1 tablet by mouth daily      busPIRone (BUSPAR) 15 MG tablet Take 1 tablet by mouth daily      gabapentin (NEURONTIN) 100 MG capsule Take 1 capsule by mouth 2 times daily.  fluticasone (FLONASE) 50 MCG/ACT nasal spray 2 sprays by Nasal route daily 1 Bottle 8    buPROPion (WELLBUTRIN) 75 MG tablet Take 75 mg by mouth 2 times daily      oxybutynin (DITROPAN) 5 MG tablet Take 5 mg by mouth 2 times daily      hydroxychloroquine (PLAQUENIL) 200 MG tablet Take 200 mg by mouth daily.  ferrous sulfate 325 (65 FE) MG tablet Take 325 mg by mouth 2 times daily        No current facility-administered medications for this visit. PHYSICAL EXAMINATION / OBJECTIVE   Objective: There were no vitals taken for this visit. Physical Exam  Vitals reviewed. Constitutional:       Appearance: She is well-developed. Cardiovascular:      Rate and Rhythm: Normal rate and regular rhythm. Pulmonary:      Effort: Pulmonary effort is normal.      Breath sounds: Normal breath sounds. Musculoskeletal:      Cervical back: Normal range of motion and neck supple. Skin:     General: Skin is warm and dry. Findings: No rash. Neurological:      Mental Status: She is alert and oriented to person, place, and time. Psychiatric:         Thought Content: Thought content normal.       Upper extremities:    SHOULDERS + limited ROM bilat. Tender bilat. + swelling right  ELBOWS tender bilat,   WRISTS tender and swelling bilat,   HANDS/FINGERS tender and swelling bilat MCPs and PIPs.  + swan neck changes, ulnar deviation, MCP subluxation    Lower extremities:  ANKLES tender and swelling bilat   FEET : tender bilat MTPs, + swelling right        LABS    CBC  Lab Results   Component Value Date    WBC 3.60 12/10/2021    RBC 3.67 12/10/2021    HGB 10.9 12/10/2021    HCT 34.9 12/10/2021    MCV 95.0 12/10/2021    MCH 29.7 12/10/2021    MCHC 31.2 12/10/2021    RDW 52.6 12/10/2021     12/10/2021       CMP  Lab Results   Component Value Date    CALCIUM 8.8 12/10/2021    LABALBU 2.5 12/10/2021     12/10/2021    K 3.9 12/10/2021    CO2 27 12/10/2021     12/10/2021    BUN 14 12/10/2021    CREATININE 0.8 12/10/2021    ALKPHOS 93 12/10/2021    ALT 12 12/10/2021    AST 13 12/10/2021       HgBA1c: No components found for: HGBA1C    No results found for: VITD25      No results found for: ANASCRN  No results found for: SSA  No results found for: SSB  No results found for: ANTI-SMITH  No results found for: DSDNAAB   No results found for: ANTIRNP  No results found for: C3, C4  No results found for: CCPAB  Lab Results   Component Value Date     12/10/2021       No components found for: CANCASCRN, APANCASCRN  Lab Results   Component Value Date    SEDRATE 55 12/10/2021     Lab Results   Component Value Date    CRP 9.9 12/10/2021       RADIOLOGY:        DEXA: 6/8/2021 ---    Lumbar spine L1-4: BMD: 0.876 g/cm², T score: -1.6  Right hip (total) BMD: 0.567 g/cm², T score: -2.1  Right hip (neck), BMD: 0.414, T score: -3.9    ASSESSMENT/PLAN    Assessment   Plan     Seropositive rheumatoid arthritis + RF > 650, +   - prior tx: methotrexate 17.5 mg weekly, plaquenil 200 mg BID, enbrel (recurrent MRSA infections). -restart methotrexate 17.5 mg PO weekly & folic acid 1 mg daily (restarted 2/10/22)    Osteoporosis  - prior femoral neck fracture. DEXA 6/2021 as above. - alendronate 70 mg weekly    Osteoarthritis of multiple joints   - continue excedrin PRN    Tarsal tunnel syndrome  - prior EMG- can be related to #1    Open wound of left knee- active    Leukopenia/lymphopenia  - neg CTD evaluation. Can be related to rheumatoid arthritis (felty, TTIARA)    H/o substance abuse  - UDS in care everywhere w/ opiate and amphetamine use    Medication monitoring   - CBC, CMP, sed rate, CRP q 4 weeks x3- due now      No follow-ups on file. Electronically signed by CAROLINA Garay - CNP on 3/24/2022 at 10:32 AM    New Prescriptions    No medications on file       Thank you for allowing me to participate in the care of this patient.    Please call if there are any questions.

## 2022-04-14 DIAGNOSIS — M05.9 SEROPOSITIVE RHEUMATOID ARTHRITIS (HCC): ICD-10-CM

## 2022-04-14 RX ORDER — METHOTREXATE 2.5 MG/1
TABLET ORAL
Qty: 28 TABLET | Refills: 0 | OUTPATIENT
Start: 2022-04-14

## 2022-04-21 DIAGNOSIS — M05.9 SEROPOSITIVE RHEUMATOID ARTHRITIS (HCC): ICD-10-CM

## 2022-04-21 RX ORDER — METHOTREXATE 2.5 MG/1
TABLET ORAL
Qty: 28 TABLET | Refills: 0 | OUTPATIENT
Start: 2022-04-21

## 2022-05-04 NOTE — TELEPHONE ENCOUNTER
Morgan Warner (on [facility]), pts friend is calling asking about labs. She said there was issues with her transportation service and getting labs drawn. Morgan Warner asked if she would be okay to wait until the day of her appt and get [facility]. I let her know that the pt may not be able to get her refills without the labs and they are supposed to be done every 4 wks. She voiced understanding and stated that she thinks she's been off the medication anyways.   Please follow up with Morgan Warner to advise  [phone]

## 2022-05-26 ENCOUNTER — NURSE ONLY (OUTPATIENT)
Dept: LAB | Age: 62
End: 2022-05-26

## 2022-05-26 DIAGNOSIS — Z51.81 MEDICATION MONITORING ENCOUNTER: ICD-10-CM

## 2022-05-26 LAB
ALBUMIN SERPL-MCNC: 3.8 G/DL (ref 3.5–5.1)
ALP BLD-CCNC: 123 U/L (ref 38–126)
ALT SERPL-CCNC: < 5 U/L (ref 11–66)
ANION GAP SERPL CALCULATED.3IONS-SCNC: 12 MEQ/L (ref 8–16)
AST SERPL-CCNC: 10 U/L (ref 5–40)
BASOPHILS # BLD: 0.6 %
BASOPHILS ABSOLUTE: 0 THOU/MM3 (ref 0–0.1)
BILIRUB SERPL-MCNC: 0.3 MG/DL (ref 0.3–1.2)
BUN BLDV-MCNC: 23 MG/DL (ref 7–22)
C-REACTIVE PROTEIN: 0.51 MG/DL (ref 0–1)
CALCIUM SERPL-MCNC: 9.6 MG/DL (ref 8.5–10.5)
CHLORIDE BLD-SCNC: 108 MEQ/L (ref 98–111)
CO2: 23 MEQ/L (ref 23–33)
CREAT SERPL-MCNC: 0.8 MG/DL (ref 0.4–1.2)
EOSINOPHIL # BLD: 3.4 %
EOSINOPHILS ABSOLUTE: 0.3 THOU/MM3 (ref 0–0.4)
ERYTHROCYTE [DISTWIDTH] IN BLOOD BY AUTOMATED COUNT: 15 % (ref 11.5–14.5)
ERYTHROCYTE [DISTWIDTH] IN BLOOD BY AUTOMATED COUNT: 56.7 FL (ref 35–45)
GFR SERPL CREATININE-BSD FRML MDRD: 72 ML/MIN/1.73M2
GLUCOSE BLD-MCNC: 133 MG/DL (ref 70–108)
HCT VFR BLD CALC: 40.9 % (ref 37–47)
HEMOGLOBIN: 12.8 GM/DL (ref 12–16)
IMMATURE GRANS (ABS): 0.02 THOU/MM3 (ref 0–0.07)
IMMATURE GRANULOCYTES: 0.2 %
LYMPHOCYTES # BLD: 9.7 %
LYMPHOCYTES ABSOLUTE: 0.8 THOU/MM3 (ref 1–4.8)
MCH RBC QN AUTO: 32.2 PG (ref 26–33)
MCHC RBC AUTO-ENTMCNC: 31.3 GM/DL (ref 32.2–35.5)
MCV RBC AUTO: 102.8 FL (ref 81–99)
MONOCYTES # BLD: 11.2 %
MONOCYTES ABSOLUTE: 0.9 THOU/MM3 (ref 0.4–1.3)
NUCLEATED RED BLOOD CELLS: 0 /100 WBC
PLATELET # BLD: 382 THOU/MM3 (ref 130–400)
PMV BLD AUTO: 10.8 FL (ref 9.4–12.4)
POTASSIUM SERPL-SCNC: 4.7 MEQ/L (ref 3.5–5.2)
RBC # BLD: 3.98 MILL/MM3 (ref 4.2–5.4)
SEDIMENTATION RATE, ERYTHROCYTE: 33 MM/HR (ref 0–20)
SEG NEUTROPHILS: 74.9 %
SEGMENTED NEUTROPHILS ABSOLUTE COUNT: 6.1 THOU/MM3 (ref 1.8–7.7)
SODIUM BLD-SCNC: 143 MEQ/L (ref 135–145)
TOTAL PROTEIN: 6.1 G/DL (ref 6.1–8)
WBC # BLD: 8.2 THOU/MM3 (ref 4.8–10.8)

## 2022-05-27 DIAGNOSIS — M05.9 SEROPOSITIVE RHEUMATOID ARTHRITIS (HCC): ICD-10-CM

## 2022-06-01 DIAGNOSIS — M05.9 SEROPOSITIVE RHEUMATOID ARTHRITIS (HCC): ICD-10-CM

## 2022-06-07 ENCOUNTER — OFFICE VISIT (OUTPATIENT)
Dept: RHEUMATOLOGY | Age: 62
End: 2022-06-07
Payer: COMMERCIAL

## 2022-06-07 VITALS
HEIGHT: 64 IN | HEART RATE: 96 BPM | OXYGEN SATURATION: 94 % | DIASTOLIC BLOOD PRESSURE: 74 MMHG | BODY MASS INDEX: 44.39 KG/M2 | SYSTOLIC BLOOD PRESSURE: 112 MMHG | WEIGHT: 260 LBS

## 2022-06-07 DIAGNOSIS — M05.9 SEROPOSITIVE RHEUMATOID ARTHRITIS (HCC): Primary | ICD-10-CM

## 2022-06-07 DIAGNOSIS — Z51.81 MEDICATION MONITORING ENCOUNTER: ICD-10-CM

## 2022-06-07 DIAGNOSIS — M15.9 OSTEOARTHRITIS OF MULTIPLE JOINTS, UNSPECIFIED OSTEOARTHRITIS TYPE: ICD-10-CM

## 2022-06-07 DIAGNOSIS — M81.0 OSTEOPOROSIS, UNSPECIFIED OSTEOPOROSIS TYPE, UNSPECIFIED PATHOLOGICAL FRACTURE PRESENCE: ICD-10-CM

## 2022-06-07 DIAGNOSIS — S81.002S OPEN WOUND OF LEFT KNEE, SEQUELA: ICD-10-CM

## 2022-06-07 PROCEDURE — G8417 CALC BMI ABV UP PARAM F/U: HCPCS | Performed by: INTERNAL MEDICINE

## 2022-06-07 PROCEDURE — 1036F TOBACCO NON-USER: CPT | Performed by: INTERNAL MEDICINE

## 2022-06-07 PROCEDURE — G8427 DOCREV CUR MEDS BY ELIG CLIN: HCPCS | Performed by: INTERNAL MEDICINE

## 2022-06-07 PROCEDURE — 3017F COLORECTAL CA SCREEN DOC REV: CPT | Performed by: INTERNAL MEDICINE

## 2022-06-07 PROCEDURE — 99215 OFFICE O/P EST HI 40 MIN: CPT | Performed by: INTERNAL MEDICINE

## 2022-06-07 RX ORDER — LEFLUNOMIDE 10 MG/1
10 TABLET ORAL DAILY
Qty: 30 TABLET | Refills: 0 | Status: SHIPPED | OUTPATIENT
Start: 2022-06-07 | End: 2022-06-29 | Stop reason: SDUPTHER

## 2022-06-07 ASSESSMENT — ENCOUNTER SYMPTOMS
SHORTNESS OF BREATH: 0
BACK PAIN: 1
COUGH: 1
DIARRHEA: 0
TROUBLE SWALLOWING: 0
EYE PAIN: 0
NAUSEA: 0
ABDOMINAL PAIN: 0
CONSTIPATION: 0
EYE ITCHING: 0

## 2022-06-07 NOTE — PATIENT INSTRUCTIONS
Patient Education        leflunomide  Pronunciation: le FLOO wild mide  Brand: Cindy  What is the most important information I should know about leflunomide? Do not use leflunomide if you are pregnant, and stop taking this medicine if you think you might be pregnant. Use birth control to prevent pregnancy while you are taking leflunomide, anduntil you complete a \"drug elimination\" procedure. Leflunomide can cause severe or fatal liver damage. Tell your doctor if you have a history of liver disease or if you also use other medicines such as: pain or arthritis medicine (including aspirin, Tylenol, and Advil/Motrin), medicines to treat tuberculosis or other infections, seizure medication, hormonal birth control or hormone replacement therapy, chemotherapy, cholesterol-lowering medicine, heart medication, orblood pressure medicine. Your liver function will need to be tested often, and you may need to stop taking leflunomide based on the results of thesetests. What is leflunomide? Leflunomide affects the immune system and reduces swelling and inflammation inthe body. Leflunomide is used to treat the symptoms of rheumatoid arthritis. Leflunomide may also be used for purposes not listed in this medication guide. What should I discuss with my healthcare provider before taking leflunomide? You should not use this medicine if you are allergic to leflunomide orteriflunomide, or if:   you are pregnant (you will need to have a negative pregnancy test before starting this treatment);   you have severe liver disease; or   you are also using teriflunomide. Do not use leflunomide if you are pregnant or may become pregnant. Avoid getting pregnant until after you stop taking leflunomide and undergo a \"drug elimination\" procedure to help rid your body of this medicine. Stop taking leflunomide and call your doctor right away if you miss a period or think you might be pregnant.   To make sure leflunomide is safe for you, tell your doctor if you have:   a history of liver disease or hepatitis (leflunomide can cause severe liver problems);   a severe or uncontrolled infection;   kidney disease;   nerve problems, such as neuropathy caused by diabetes;   a history of tuberculosis;   a weak immune system or bone marrow disorder; or   if you are using any drugs that weaken your immune system (such as cancer medicine or steroids). Use birth control to prevent pregnancy while you are taking this medicine. After you stop taking leflunomide, continue using birth control until you have received blood tests to make sure the drug has been eliminated from yourbody. Ask your doctor if you should use a barrier form of birth control (condom or diaphragm with spermicide). Using hormonal contraception (birth control pills, injections, implants, skin patches, and vaginal rings) may increase your riskof liver damage while taking leflunomide. It is not known whether leflunomide passes into breast milk or if it could harma nursing baby. You should not breast-feed while using this medicine. How should I take leflunomide? Before you start treatment with leflunomide, your doctor may perform tests tomake sure you do not have tuberculosis or other infections. Follow all directions on your prescription label. Your doctor may occasionally change your dose. Do not use this medicine in larger or smaller amounts or forlonger than recommended. Your blood pressure will need to be checked often. Leflunomide can lower blood cells that help your body fight infections and help your blood to clot. Your blood will need to be tested often. Your leflunomide treatment may be stopped for a short time based on theresults of these tests. Your liver function will also need to be tested often, and you may need to stop taking leflunomide based on the results of thesetests.   After you stop taking leflunomide, you may need to be treated with other medicines to help your body eliminate leflunomide quickly. If you do not undergo this drug elimination procedure, leflunomide could stay in your body for up to 2 years. Follow your doctor's instructions. You will also need to go through this drug elimination procedure if you plan tobecome pregnant after you stop taking leflunomide. Arthritis is often treated with a combination of drugs. Use all medications as directed by your doctor. Read the medication guide or patient instructions provided with each medication. Do not change your doses or medication schedule without your doctor's advice. Store at room temperature away from moisture, heat, and light. What happens if I miss a dose? Take the missed dose as soon as you remember. Skip the missed dose if it is almost time for your next scheduled dose. Do not take extra medicine to make up the missed dose. What happens if I overdose? Seek emergency medical attention or call the Poison Help line at 1-972.391.3597. Overdose symptoms may include diarrhea, stomach pain, pale skin, easy bruisingor bleeding, dark urine, or jaundice (yellowing of the skin or eyes). What should I avoid while taking leflunomide? Avoid being near people who have colds, the flu, or other contagious illnesses. Contact your doctor at once if you develop signs of infection. Do not receive a \"live\" vaccine while using leflunomide, or you could develop a serious infection. Live vaccines include measles, mumps, rubella (MMR), polio, rotavirus, typhoid, yellow fever, varicella(chickenpox), zoster (shingles), and nasal flu (influenza) vaccine. What are the possible side effects of leflunomide? Get emergency medical help if you have signs of an allergic reaction: hives; difficulty breathing; swelling of your face, lips, tongue, or throat.   Call your doctor at once if you have:   signs of infection --sudden weakness or ill feeling, fever, chills, sore throat, mouth sores, red or swollen gums, trouble swallowing;   sudden chest pain or discomfort, wheezing, dry cough, feeling short of breath;   easy bruising, unusual bleeding (nose, mouth, vagina, or rectum), purple or red pinpoint spots under your skin;   numbness, tingling, or burning pain in your hands or feet;   liver problems --nausea, upper stomach pain, itching, tiredness, loss of appetite, dark urine, claudia-colored stools, jaundice (yellowing of the skin or eyes); or   severe skin reaction --fever, sore throat, swelling in your face or tongue, burning in your eyes, skin pain, followed by a red or purple skin rash that spreads (especially in the face or upper body) and causes blistering and peeling. Common side effects may include:   nausea, diarrhea, stomach pain;   headache;   abnormal liver function tests;   thinning hair;   back pain;   weakness;   rash; or   high blood pressure. This is not a complete list of side effects and others may occur. Call your doctor for medical advice about side effects. You may report side effects toFDA at 9-896-RXM-5093. What other drugs will affect leflunomide? Leflunomide can cause severe or fatal liver damage.  This effect is increased when you also use certain other medicines, including:   acetaminophen (Tylenol), aspirin, gout or arthritis medication (including gold injections); an NSAID (non-steroidal anti-inflammatory drug) --ibuprofen (Advil, Motrin), naproxen (Aleve), celecoxib, diclofenac, indomethacin, meloxicam, and others;   an antibiotic, antifungal medicine, or sulfa drug; tuberculosis medicine; antiviral or HIV/AIDS medication; medicine to treat mental illness; seizure medication --carbamazepine, phenytoin, valproic acid, and others;   birth control pills or hormone replacement therapy; anabolic steroids --methyltestosterone, \"performance-enhancing drugs\"; cancer medication; or   cholesterol-lowering medication --Crestor, Lipitor, Vytorin, Zocor, and others; heart or blood pressure medication. This list is not complete and many other drugs can interact with leflunomide. This includes prescription and over-the-counter medicines, vitamins, and herbal products. Give a list of all your medicines to any healthcare provider who treats you. Not all possible interactions are listed in this medication guide. Where can I get more information? Your pharmacist can provide more information about leflunomide. Remember, keep this and all other medicines out of the reach of children, never share your medicines with others, and use this medication only for the indication prescribed. Every effort has been made to ensure that the information provided by Renee Ku [address] is accurate, up-to-date, and complete, but no guarantee is made to that effect. Drug information contained herein may be time sensitive. [facility] information has been compiled for use by healthcare practitioners and consumers in the [place] and therefore [facility] does not warrant that uses outside of the [place] are appropriate, unless specifically indicated otherwise. [facility]'s drug information does not endorse drugs, diagnose patients or recommend therapy. [facility]'s drug information is an informational resource designed to assist licensed healthcare practitioners in caring for their patients and/or to serve consumers viewing this service as a supplement to, and not a substitute for, the expertise, skill, knowledge and judgment of healthcare practitioners. The absence of a warning for a given drug or drug combination in no way should be construed to indicate that the drug or drug combination is safe, effective or appropriate for any given patient. [facility] does not assume any responsibility for any aspect of healthcare administered with the aid of information [facility] provides.  The information contained herein is not intended to cover all possible uses, directions, precautions, warnings, drug interactions, allergic reactions, or adverse effects. If you have questions about the drugs you are taking, check with yourdoctor, nurse or pharmacist.  Copyright 4139-1258 [address]. Version: 9.01. Revision date: 1/16/2017. Care instructions adapted under license by [facility] ([facility]). If you have questions about a medical condition or this instruction, always ask your healthcare professional. [place] [address] any warranty or liability for your use of this information.

## 2022-06-07 NOTE — PROGRESS NOTES
[facility] RHEUMATOLOGY FOLLOW UP NOTE       Date Of Service: 6/7/2022  Provider: Nancy Daily DO    Name: Bear Champagne   MRN: [id number]    Chief Complaint(s)     Chief Complaint   Patient presents with    Follow-up     2 month follow up        History of Present Illness (HPI)     Bear Champagne  is a(n)62 y.o. female with a hx of anemia, rheumatoid arthritis, atrial fibrillation with RVR, bladder incontinence, depression, GERD, hypokalemia, hypermobility syndrome, major depression disorder, migraines morbid obesity, [place] on CPAP, osteoarthritis, tibial plateau fracture, osteoporosis  here for the f/u evaluation of rheumatoid arthritis     Methotrexate 17.5mg q wk, - decreased pain and swelling of the hands, improved mobility of the finger. Ongoing joint pains of the  fingers, wrists, elbows, shoulders, hips, knees, ankles, toes, neck, back  Pain currently 8/10, greatst in the hips, shoulder and fingers. Constant varialbe joint pains. Timing: mornings Alleviating factors: excedrin. Inability to wt bearing, ambulating with electric wheelchart. AM stiffness lasting several hours. Continued joint swelling     Surgical site opening left knee w/ picture on the phone. + occasional blood drainage   -- Continued to follow with Dr. Roland Mae in [place] (infectious disease) for open wound    REVIEW OF SYSTEMS: (ROS)    Review of Systems   Constitutional: Positive for fatigue. Negative for fever and unexpected weight change. HENT: Negative for congestion and trouble swallowing. Eyes: Negative for pain and itching. Dry eyes   Respiratory: Positive for cough (w/ occasional sputum). Negative for shortness of breath. Cardiovascular: Negative for chest pain and leg swelling. Gastrointestinal: Negative for abdominal pain, constipation, diarrhea and nausea. Endocrine: Negative for cold intolerance and heat intolerance. Genitourinary: Negative for difficulty urinating, frequency and urgency. Musculoskeletal: Positive for arthralgias, back pain, myalgias and neck pain. Negative for joint swelling. Skin: Positive for wound. Negative for rash. Neurological: Positive for weakness and headaches. Negative for dizziness and numbness. Psychiatric/Behavioral: The patient is nervous/anxious.         PAST MEDICAL HISTORY      Past Medical History:   Diagnosis Date    Allergic rhinitis     Anxiety     Arthritis     Cataract     Depression     Hypertension     Insomnia     [place] (obstructive sleep apnea)     Osteoarthritis     Osteoporosis     Urinary incontinence        PAST SURGICAL HISTORY      Past Surgical History:   Procedure Laterality Date    ANKLE FRACTURE SURGERY      hardware placed    APPENDECTOMY      CHOLECYSTECTOMY      COLONOSCOPY      2014    CYST REMOVAL  1992    JOINT REPLACEMENT      rt    JOINT REPLACEMENT      x 2    TUBAL LIGATION      UPPER GASTROINTESTINAL ENDOSCOPY      2014       FAMILY HISTORY      Family History   Problem Relation Age of Onset    High Blood Pressure Father     Prostate Cancer Father     Colon Cancer Maternal Grandmother     Colon Polyps Maternal Grandmother     Other Brother         agent orange       SOCIAL HISTORY      Social History     Tobacco History     Smoking Status  Never Smoker    Smokeless Tobacco Use  Never Used          Alcohol History     Alcohol Use Status  No          Drug Use     Drug Use Status  No          Sexual Activity     Sexually Active  Not Asked                ALLERGIES     Allergies   Allergen Reactions    Sulfamethoxazole-Trimethoprim Hives       CURRENT MEDICATIONS      Current Outpatient Medications   Medication Sig Dispense Refill    cyanocobalamin 1000 MCG tablet Take 1,000 mcg by mouth daily      methotrexate (RHEUMATREX) 2.5 MG chemo tablet Take 7 tablets by mouth once a week Take 4 tablets in the morning and 3 tablets in the evening 28 tablet 0    folic acid (FOLVITE) 1 MG tablet Take 1 tablet by mouth daily 90 tablet 1    amLODIPine (NORVASC) 2.5 MG tablet Take 1 tablet by mouth daily      busPIRone (BUSPAR) 15 MG tablet Take 1 tablet by mouth daily      gabapentin (NEURONTIN) 100 MG capsule Take 1 capsule by mouth 2 times daily.  buPROPion (WELLBUTRIN) 75 MG tablet Take 75 mg by mouth 2 times daily      oxybutynin (DITROPAN) 5 MG tablet Take 5 mg by mouth 2 times daily      ferrous sulfate 325 (65 FE) MG tablet Take 325 mg by mouth 2 times daily       fluticasone (FLONASE) 50 MCG/ACT nasal spray 2 sprays by Nasal route daily 1 Bottle 8    hydroxychloroquine (PLAQUENIL) 200 MG tablet Take 200 mg by mouth daily. (Patient not taking: Reported on 6/7/2022)       No current facility-administered medications for this visit. PHYSICAL EXAMINATION / OBJECTIVE   Objective:  /74 (Site: Left Lower Arm, Position: Sitting, Cuff Size: Large Adult)   Pulse 96   Ht 5' 4.02\" (1.626 m)   Wt 260 lb (117.9 kg)   SpO2 94%   BMI 44.61 kg/m²     Physical Exam  Vitals reviewed. Constitutional:       Appearance: She is well-developed. Cardiovascular:      Rate and Rhythm: Normal rate and regular rhythm. Pulmonary:      Effort: Pulmonary effort is normal.      Breath sounds: Normal breath sounds. Musculoskeletal:      Cervical back: Normal range of motion and neck supple. Skin:     General: Skin is warm and dry. Findings: No rash. Neurological:      Mental Status: She is alert and oriented to person, place, and time. Psychiatric:         Thought Content: Thought content normal.       Upper extremities:    SHOULDERS + limited ROM bilat. Tender bilat. + swelling right  ELBOWS tender bilat,   WRISTS tender and swelling bilat,   HANDS/FINGERS tender and swelling bilat MCPs and PIPs. + swan neck changes, ulnar deviation, MCP subluxation bilateral     Lower extremities:  ANKLES tender and swelling bilat   FEET : tender bilat MTPs.         LABS    CBC  Lab Results   Component Value Date    WBC 8.2 05/26/2022    RBC 3.98 05/26/2022    HGB 12.8 05/26/2022    HCT 40.9 05/26/2022    .8 05/26/2022    MCH 32.2 05/26/2022    MCHC 31.3 05/26/2022    RDW 52.6 12/10/2021     05/26/2022       CMP  Lab Results   Component Value Date    CALCIUM 9.6 05/26/2022    LABALBU 3.8 05/26/2022    PROT 6.1 05/26/2022     05/26/2022    K 4.7 05/26/2022    CO2 23 05/26/2022     05/26/2022    BUN 23 05/26/2022    CREATININE 0.8 05/26/2022    ALKPHOS 123 05/26/2022    ALT <5 05/26/2022    AST 10 05/26/2022     Lab Results   Component Value Date     12/10/2021       No components found for: CANCASCRN, APANCASCRN  Lab Results   Component Value Date    SEDRATE 33 (H) 05/26/2022     Lab Results   Component Value Date    CRP 0.51 05/26/2022       RADIOLOGY:        DEXA: 6/8/2021 ---    Lumbar spine L1-4: BMD: 0.876 g/cm², T score: -1.6  Right hip (total) BMD: 0.567 g/cm², T score: -2.1  Right hip (neck), BMD: 0.414, T score: -3.9    ASSESSMENT/PLAN    Assessment   Plan     Seropositive rheumatoid arthritis + RF > 650, +   - prior tx: methotrexate 17.5 mg weekly, plaquenil 200 mg BID, enbrel (recurrent MRSA infections). -- methotrexate 17.5 mg PO weekly & folic acid 1 mg daily (restarted 2/10/22)   -- start arava 10 mg daily    -- would like to start orencia b/c of the active synovitis, + ccp ab, prior recurrent infection with anti-TNF therapy (enbrel) . --- if okay with plastic surgeon and if there is no evidence of infection could puruse       Osteoporosis - prior femoral neck fracture. DEXA 6/2021 as above. - alendronate 70 mg weekly    Osteoarthritis of multiple joints -- continue excedrin PRN    Tarsal tunnel syndrome -- prior EMG- related to #1    Open wound of left knee- active    - scheduled for plastic surgery evaluation Tuesday of next week. 6/14/2022. Leukopenia-- resolved   Lymphopenia -- active but improved. - ? rheumatoid arthritis (felty, T-LGL).  Negative evaluation for systemic lupus or other CTD'    H/o substance abuse  - UDS in care everywhere w/ opiate and amphetamine use  - avoiding pain medications     Medication monitoring   - CBC, CMP, sed rate, CRP q 4 weeks x3 w/ arava start. No follow-ups on file. Electronically signed by Orquidea Coffman DO on 6/7/2022 at 2:25 PM    New Prescriptions    No medications on file       Thank you for allowing me to participate in the care of this patient. Please call if there are any questions.

## 2022-06-16 DIAGNOSIS — M05.9 SEROPOSITIVE RHEUMATOID ARTHRITIS (HCC): ICD-10-CM

## 2022-06-16 RX ORDER — METHOTREXATE 2.5 MG/1
TABLET ORAL
Qty: 28 TABLET | Refills: 0 | OUTPATIENT
Start: 2022-06-16

## 2022-06-28 ENCOUNTER — TELEPHONE (OUTPATIENT)
Dept: RHEUMATOLOGY | Age: 62
End: 2022-06-28

## 2022-06-28 DIAGNOSIS — Z51.81 MEDICATION MONITORING ENCOUNTER: Primary | ICD-10-CM

## 2022-06-28 LAB
ALBUMIN SERPL-MCNC: 3.1 G/DL
ALP BLD-CCNC: 107 U/L
ALT SERPL-CCNC: 19 U/L
ANION GAP SERPL CALCULATED.3IONS-SCNC: 13 MMOL/L
AST SERPL-CCNC: 13 U/L
BASOPHILS ABSOLUTE: 0.02 /ΜL
BASOPHILS RELATIVE PERCENT: 0.4 %
BILIRUB SERPL-MCNC: 0.5 MG/DL (ref 0.1–1.4)
BUN BLDV-MCNC: 24 MG/DL
C-REACTIVE PROTEIN: 5.8
CALCIUM SERPL-MCNC: 9.4 MG/DL
CHLORIDE BLD-SCNC: 107 MMOL/L
CO2: 27 MMOL/L
CREAT SERPL-MCNC: 0.9 MG/DL
EOSINOPHILS ABSOLUTE: 0.23 /ΜL
EOSINOPHILS RELATIVE PERCENT: 4.1 %
GFR CALCULATED: 67
GLUCOSE BLD-MCNC: 111 MG/DL
HCT VFR BLD CALC: 37.4 % (ref 36–46)
HEMOGLOBIN: 12.3 G/DL (ref 12–16)
LYMPHOCYTES ABSOLUTE: 0.64 /ΜL
LYMPHOCYTES RELATIVE PERCENT: 11.4 %
MCH RBC QN AUTO: 31.5 PG
MCHC RBC AUTO-ENTMCNC: 32.9 G/DL
MCV RBC AUTO: 95.7 FL
MONOCYTES ABSOLUTE: 0.26 /ΜL
MONOCYTES RELATIVE PERCENT: 4.7 %
NEUTROPHILS ABSOLUTE: 4.43 /ΜL
NEUTROPHILS RELATIVE PERCENT: 79.2 %
PDW BLD-RTO: 47.8 %
PLATELET # BLD: 382 K/ΜL
PMV BLD AUTO: 10.2 FL
POTASSIUM SERPL-SCNC: 3.9 MMOL/L
RBC # BLD: 3.91 10^6/ΜL
SEDIMENTATION RATE, ERYTHROCYTE: 44
SODIUM BLD-SCNC: 143 MMOL/L
TOTAL PROTEIN: 6.8
WBC # BLD: 5.59 10^3/ML

## 2022-06-29 DIAGNOSIS — M05.9 SEROPOSITIVE RHEUMATOID ARTHRITIS (HCC): ICD-10-CM

## 2022-06-29 RX ORDER — LEFLUNOMIDE 10 MG/1
10 TABLET ORAL DAILY
Qty: 30 TABLET | Refills: 0 | Status: SHIPPED | OUTPATIENT
Start: 2022-06-29 | End: 2022-07-19 | Stop reason: SDUPTHER

## 2022-07-15 DIAGNOSIS — M05.9 SEROPOSITIVE RHEUMATOID ARTHRITIS (HCC): ICD-10-CM

## 2022-07-15 RX ORDER — METHOTREXATE 2.5 MG/1
TABLET ORAL
Qty: 28 TABLET | Refills: 0 | OUTPATIENT
Start: 2022-07-15

## 2022-07-19 ENCOUNTER — OFFICE VISIT (OUTPATIENT)
Dept: RHEUMATOLOGY | Age: 62
End: 2022-07-19
Payer: COMMERCIAL

## 2022-07-19 VITALS
DIASTOLIC BLOOD PRESSURE: 80 MMHG | OXYGEN SATURATION: 96 % | SYSTOLIC BLOOD PRESSURE: 140 MMHG | WEIGHT: 260 LBS | BODY MASS INDEX: 44.61 KG/M2 | HEART RATE: 82 BPM

## 2022-07-19 DIAGNOSIS — M15.9 OSTEOARTHRITIS OF MULTIPLE JOINTS, UNSPECIFIED OSTEOARTHRITIS TYPE: ICD-10-CM

## 2022-07-19 DIAGNOSIS — G57.50 TARSAL TUNNEL SYNDROME, UNSPECIFIED LATERALITY: ICD-10-CM

## 2022-07-19 DIAGNOSIS — M05.9 SEROPOSITIVE RHEUMATOID ARTHRITIS (HCC): Primary | ICD-10-CM

## 2022-07-19 DIAGNOSIS — Z51.81 MEDICATION MONITORING ENCOUNTER: ICD-10-CM

## 2022-07-19 DIAGNOSIS — S81.002S OPEN WOUND OF LEFT KNEE, SEQUELA: ICD-10-CM

## 2022-07-19 DIAGNOSIS — D72.810 LYMPHOPENIA: ICD-10-CM

## 2022-07-19 DIAGNOSIS — M81.0 OSTEOPOROSIS, UNSPECIFIED OSTEOPOROSIS TYPE, UNSPECIFIED PATHOLOGICAL FRACTURE PRESENCE: ICD-10-CM

## 2022-07-19 PROCEDURE — G8417 CALC BMI ABV UP PARAM F/U: HCPCS | Performed by: NURSE PRACTITIONER

## 2022-07-19 PROCEDURE — G8427 DOCREV CUR MEDS BY ELIG CLIN: HCPCS | Performed by: NURSE PRACTITIONER

## 2022-07-19 PROCEDURE — 1036F TOBACCO NON-USER: CPT | Performed by: NURSE PRACTITIONER

## 2022-07-19 PROCEDURE — 99214 OFFICE O/P EST MOD 30 MIN: CPT | Performed by: NURSE PRACTITIONER

## 2022-07-19 PROCEDURE — 3017F COLORECTAL CA SCREEN DOC REV: CPT | Performed by: NURSE PRACTITIONER

## 2022-07-19 RX ORDER — LEFLUNOMIDE 20 MG/1
20 TABLET ORAL DAILY
Qty: 20 TABLET | Refills: 0 | Status: SHIPPED | OUTPATIENT
Start: 2022-07-19

## 2022-07-19 ASSESSMENT — ENCOUNTER SYMPTOMS
DIARRHEA: 0
TROUBLE SWALLOWING: 0
ABDOMINAL PAIN: 0
BACK PAIN: 1
EYE ITCHING: 0
SHORTNESS OF BREATH: 0
CONSTIPATION: 0
NAUSEA: 0
COUGH: 0
EYE PAIN: 0

## 2022-07-19 NOTE — PROGRESS NOTES
[facility] RHEUMATOLOGY FOLLOW UP NOTE       Date Of Service: 7/19/2022  Provider: CAROLINA Aguila - CNP    Name: Charles Edmondson   MRN: [id number]    Chief Complaint(s)     Chief Complaint   Patient presents with    Follow-up     6 wk f/u ESVIN        History of Present Illness (HPI)     Charles Edmondson  is a(n)62 y.o. female with a hx of anemia, rheumatoid arthritis, atrial fibrillation with RVR, bladder incontinence, depression, GERD, hypokalemia, hypermobility syndrome, major depression disorder, migraines morbid obesity, [place] on CPAP, osteoarthritis, tibial plateau fracture, osteoporosis  here for the f/u evaluation of rheumatoid arthritis     Interval hx:    - improvement with arava   - following with Dr. Racquel Bronson wound clinic- plastic surgery- [facility] for open wound on knee    pain affecting the fingers, shoulders, hips  Pain on a scale 0-10: 10/10  Type of pain: constant  Timing: mornings  Aggravating factors: touching the joints  Alleviating factors: excedrin    Associated symptoms:  denies swelling/  Redness/ warmth, + AM stiffness lasting ~ several hours    Continued to follow with Dr. Roxie Dixon in [place] (infectious disease) for open wound    REVIEW OF SYSTEMS: (ROS)    Review of Systems   Constitutional:  Positive for fatigue. Negative for fever and unexpected weight change. HENT:  Negative for congestion and trouble swallowing. Eyes:  Negative for pain and itching. Dry eyes   Respiratory:  Negative for cough and shortness of breath. Cardiovascular:  Negative for chest pain and leg swelling. Gastrointestinal:  Negative for abdominal pain, constipation, diarrhea and nausea. Endocrine: Negative for cold intolerance and heat intolerance. Genitourinary:  Negative for difficulty urinating, frequency and urgency. Musculoskeletal:  Positive for arthralgias, back pain, myalgias and neck pain. Negative for joint swelling. Skin:  Positive for wound.  Negative for rash. Neurological:  Positive for weakness and headaches. Negative for dizziness and numbness. Psychiatric/Behavioral:  The patient is nervous/anxious.       PAST MEDICAL HISTORY      Past Medical History:   Diagnosis Date    Allergic rhinitis     Anxiety     Arthritis     Cataract     Depression     Hypertension     Insomnia     [place] (obstructive sleep apnea)     Osteoarthritis     Osteoporosis     Urinary incontinence        PAST SURGICAL HISTORY      Past Surgical History:   Procedure Laterality Date    ANKLE FRACTURE SURGERY      hardware placed    APPENDECTOMY      CHOLECYSTECTOMY      COLONOSCOPY      2014    CYST REMOVAL  1992    JOINT REPLACEMENT      rt    JOINT REPLACEMENT      x 2    TUBAL LIGATION      UPPER GASTROINTESTINAL ENDOSCOPY      2014       FAMILY HISTORY      Family History   Problem Relation Age of Onset    High Blood Pressure Father     Prostate Cancer Father     Colon Cancer Maternal Grandmother     Colon Polyps Maternal Grandmother     Other Brother         agent orange       SOCIAL HISTORY      Social History       Tobacco History       Smoking Status  Never Smoker      Smokeless Tobacco Use  Never Used              Alcohol History       Alcohol Use Status  No              Drug Use       Drug Use Status  No              Sexual Activity       Sexually Active  Not Asked                    ALLERGIES     Allergies   Allergen Reactions    Sulfamethoxazole-Trimethoprim Hives       CURRENT MEDICATIONS      Current Outpatient Medications   Medication Sig Dispense Refill    leflunomide (ARAVA) 10 MG tablet Take 1 tablet by mouth daily 30 tablet 0    methotrexate (RHEUMATREX) 2.5 MG chemo tablet Take 7 tablets by mouth once a week Take 4 tablets in the morning and 3 tablets in the evening 28 tablet 0    cyanocobalamin 1000 MCG tablet Take 1,000 mcg by mouth daily      folic acid (FOLVITE) 1 MG tablet Take 1 tablet by mouth daily 90 tablet 1    amLODIPine (NORVASC) 2.5 MG tablet Take 1 tablet by mouth daily      busPIRone (BUSPAR) 15 MG tablet Take 1 tablet by mouth daily      gabapentin (NEURONTIN) 100 MG capsule Take 1 capsule by mouth 2 times daily. fluticasone (FLONASE) 50 MCG/ACT nasal spray 2 sprays by Nasal route daily 1 Bottle 8    buPROPion (WELLBUTRIN) 75 MG tablet Take 75 mg by mouth 2 times daily      oxybutynin (DITROPAN) 5 MG tablet Take 5 mg by mouth 2 times daily      hydroxychloroquine (PLAQUENIL) 200 MG tablet Take 200 mg by mouth daily. (Patient not taking: Reported on 6/7/2022)      ferrous sulfate 325 (65 FE) MG tablet Take 325 mg by mouth 2 times daily        No current facility-administered medications for this visit. PHYSICAL EXAMINATION / OBJECTIVE   Objective: There were no vitals taken for this visit. Physical Exam  Vitals reviewed. Constitutional:       Appearance: She is well-developed. Cardiovascular:      Rate and Rhythm: Normal rate and regular rhythm. Pulmonary:      Effort: Pulmonary effort is normal.      Breath sounds: Normal breath sounds. Musculoskeletal:      Cervical back: Normal range of motion and neck supple. Skin:     General: Skin is warm and dry. Findings: No rash. Neurological:      Mental Status: She is alert and oriented to person, place, and time. Psychiatric:         Thought Content: Thought content normal.     Upper extremities:    SHOULDERS + limited ROM bilat. Tender bilat. + swelling right  ELBOWS tender bilat,   WRISTS tender and swelling bilat,   HANDS/FINGERS tender and swelling bilat MCPs and PIPs.  + swan neck changes, ulnar deviation, MCP subluxation    Lower extremities:  ANKLES tender and swelling bilat   FEET : tender bilat MTPs       LABS    CBC  Lab Results   Component Value Date/Time    WBC 5.59 06/28/2022 12:00 AM    RBC 3.91 06/28/2022 12:00 AM    HGB 12.3 06/28/2022 12:00 AM    HCT 37.4 06/28/2022 12:00 AM    MCV 95.7 06/28/2022 12:00 AM    MCH 31.5 06/28/2022 12:00 AM    MCHC 32.9 06/28/2022 12:00 AM RDW 47.8 06/28/2022 12:00 AM     06/28/2022 12:00 AM       CMP  Lab Results   Component Value Date/Time    CALCIUM 9.4 06/28/2022 12:00 AM    LABALBU 3.1 06/28/2022 12:00 AM    PROT 6.1 05/26/2022 10:56 AM     06/28/2022 12:00 AM    K 3.9 06/28/2022 12:00 AM    CO2 27 06/28/2022 12:00 AM     06/28/2022 12:00 AM    BUN 24 06/28/2022 12:00 AM    CREATININE 0.9 06/28/2022 12:00 AM    ALKPHOS 107 06/28/2022 12:00 AM    ALT 19 06/28/2022 12:00 AM    AST 13 06/28/2022 12:00 AM       HgBA1c: No components found for: HGBA1C    No results found for: VITD25      No results found for: ANASCRN  No results found for: SSA  No results found for: SSB  No results found for: ANTI-SMITH  No results found for: DSDNAAB   No results found for: ANTIRNP  No results found for: C3, C4  No results found for: CCPAB  Lab Results   Component Value Date     12/10/2021       No components found for: CANCASCRN, APANCASCRN  Lab Results   Component Value Date    SEDRATE 44 06/28/2022     Lab Results   Component Value Date    CRP 5.8 06/28/2022       RADIOLOGY:        DEXA: 6/8/2021 ---    Lumbar spine L1-4: BMD: 0.876 g/cm², T score: -1.6  Right hip (total) BMD: 0.567 g/cm², T score: -2.1  Right hip (neck), BMD: 0.414, T score: -3.9    ASSESSMENT/PLAN    Assessment   Plan     Seropositive rheumatoid arthritis + RF > 650, +   - prior tx: methotrexate 17.5 mg weekly, plaquenil 200 mg BID, enbrel (recurrent MRSA infections). -methotrexate 17.5 mg PO weekly & folic acid 1 mg daily (restarted 2/10/22)   - increase arava to 20 mg daily (6/2022)   - would like to start orencia- need clearance from plastic surgery given open knee wound    Osteoporosis  - prior femoral neck fracture. DEXA 6/2021 as above.     - alendronate 70 mg weekly    Osteoarthritis of multiple joints   - continue excedrin PRN    Tarsal tunnel syndrome  - prior EMG- can be related to #1    Open wound of left knee- active- following with Dr. Avelino Duenas at [facility] evaluation. Can be related to rheumatoid arthritis (felty, T-LGL)    H/o substance abuse  - UDS in care everywhere w/ opiate and amphetamine use    Medication monitoring   - CBC, CMP, sed rate, CRP q 4 weeks x3      No follow-ups on file. Electronically signed by CAROLINA Hyatt CNP on 7/19/2022 at 11:36 AM    New Prescriptions    No medications on file       Thank you for allowing me to participate in the care of this patient. Please call if there are any questions.

## 2022-07-26 DIAGNOSIS — M05.9 SEROPOSITIVE RHEUMATOID ARTHRITIS (HCC): ICD-10-CM

## 2022-07-26 RX ORDER — METHOTREXATE 2.5 MG/1
TABLET ORAL
Qty: 28 TABLET | Refills: 0 | OUTPATIENT
Start: 2022-07-26

## 2022-07-26 NOTE — TELEPHONE ENCOUNTER
DOLV: 7/19/22  DONV: 9/19/22  LAST LAB DRAW: 6/28/22  LAST TB TEST: na    Lab Results   Component Value Date     06/28/2022    K 3.9 06/28/2022     06/28/2022    CO2 27 06/28/2022    BUN 24 06/28/2022    CREATININE 0.9 06/28/2022    GLUCOSE 111 06/28/2022    CALCIUM 9.4 06/28/2022    PROT 6.1 05/26/2022    LABALBU 3.1 06/28/2022    BILITOT 0.5 06/28/2022    ALKPHOS 107 06/28/2022    AST 13 06/28/2022    ALT 19 06/28/2022    LABGLOM 67 06/28/2022       Recent Labs     06/28/22  0000   WBC 5.59   HGB 12.3   HCT 37.4   MCV 95.7          Lab Results   Component Value Date    SEDRATE 44 06/28/2022       Lab Results   Component Value Date    CRP 5.8 06/28/2022

## 2022-07-27 ENCOUNTER — TELEPHONE (OUTPATIENT)
Dept: RHEUMATOLOGY | Age: 62
End: 2022-07-27

## 2022-07-27 NOTE — TELEPHONE ENCOUNTER
Spoke with pt and states that she had skin grafts on her knees after having MRSA and the grafts had gotten infected but are now healing. Please advise if its okay to start on orencia.

## 2022-07-27 NOTE — TELEPHONE ENCOUNTER
Can you please call Dr. Mary Dent office and see if they are okay for her to start it and verify that the infection has resolved.

## 2022-07-27 NOTE — TELEPHONE ENCOUNTER
Patients friend Cathie Ross (on [facility]) calling in for patient who was seen 7/19/2022. Cathie Ross wanted to let Sergei and Dr Issa Tucker know that Ramsey Grijalva has an appt/phone call with Dr Refugio Carl on 8/2/2022. And Cathie Ross said she asked Dr Avtar Becker, the plastic surgeon, about starting the orencia injections, and he thought that was a good idea. Please contact Dr Fernandez Magallanes office to get the ok, phone number is [phone]. Please advise.

## 2022-07-29 NOTE — TELEPHONE ENCOUNTER
Left message on nurse line at Dr Bonnie Rodriguez office with information and requesting a call back.

## 2022-08-09 DIAGNOSIS — M05.9 SEROPOSITIVE RHEUMATOID ARTHRITIS (HCC): ICD-10-CM

## 2022-08-09 RX ORDER — LEFLUNOMIDE 20 MG/1
TABLET ORAL
Qty: 20 TABLET | Refills: 0 | OUTPATIENT
Start: 2022-08-09

## 2022-08-16 DIAGNOSIS — M05.9 SEROPOSITIVE RHEUMATOID ARTHRITIS (HCC): ICD-10-CM

## 2022-08-16 RX ORDER — LEFLUNOMIDE 20 MG/1
TABLET ORAL
Qty: 20 TABLET | Refills: 0 | OUTPATIENT
Start: 2022-08-16

## 2022-08-16 RX ORDER — METHOTREXATE 2.5 MG/1
TABLET ORAL
Qty: 28 TABLET | Refills: 0 | OUTPATIENT
Start: 2022-08-16

## 2022-09-19 ENCOUNTER — OFFICE VISIT (OUTPATIENT)
Dept: RHEUMATOLOGY | Age: 62
End: 2022-09-19
Payer: COMMERCIAL

## 2022-09-19 VITALS
BODY MASS INDEX: 44.61 KG/M2 | HEIGHT: 64 IN | HEART RATE: 93 BPM | SYSTOLIC BLOOD PRESSURE: 118 MMHG | DIASTOLIC BLOOD PRESSURE: 72 MMHG | OXYGEN SATURATION: 93 %

## 2022-09-19 DIAGNOSIS — Z51.81 MEDICATION MONITORING ENCOUNTER: ICD-10-CM

## 2022-09-19 DIAGNOSIS — D72.810 LYMPHOPENIA: ICD-10-CM

## 2022-09-19 DIAGNOSIS — M81.0 OSTEOPOROSIS, UNSPECIFIED OSTEOPOROSIS TYPE, UNSPECIFIED PATHOLOGICAL FRACTURE PRESENCE: ICD-10-CM

## 2022-09-19 DIAGNOSIS — M05.9 SEROPOSITIVE RHEUMATOID ARTHRITIS (HCC): Primary | ICD-10-CM

## 2022-09-19 DIAGNOSIS — M15.9 OSTEOARTHRITIS OF MULTIPLE JOINTS, UNSPECIFIED OSTEOARTHRITIS TYPE: ICD-10-CM

## 2022-09-19 PROCEDURE — 1036F TOBACCO NON-USER: CPT | Performed by: NURSE PRACTITIONER

## 2022-09-19 PROCEDURE — 99214 OFFICE O/P EST MOD 30 MIN: CPT | Performed by: NURSE PRACTITIONER

## 2022-09-19 PROCEDURE — G8417 CALC BMI ABV UP PARAM F/U: HCPCS | Performed by: NURSE PRACTITIONER

## 2022-09-19 PROCEDURE — G8427 DOCREV CUR MEDS BY ELIG CLIN: HCPCS | Performed by: NURSE PRACTITIONER

## 2022-09-19 PROCEDURE — 3017F COLORECTAL CA SCREEN DOC REV: CPT | Performed by: NURSE PRACTITIONER

## 2022-09-19 RX ORDER — ABATACEPT 125 MG/ML
125 INJECTION, SOLUTION SUBCUTANEOUS
Qty: 4 ML | Refills: 5 | Status: ACTIVE | OUTPATIENT
Start: 2022-09-19

## 2022-09-19 ASSESSMENT — ENCOUNTER SYMPTOMS
COUGH: 0
SHORTNESS OF BREATH: 0
DIARRHEA: 0
EYE ITCHING: 0
NAUSEA: 0
TROUBLE SWALLOWING: 0
BACK PAIN: 1
EYE PAIN: 0
ABDOMINAL PAIN: 0
CONSTIPATION: 0

## 2022-09-19 NOTE — PROGRESS NOTES
[facility] RHEUMATOLOGY FOLLOW UP NOTE       Date Of Service: 9/19/2022  Provider: CAROLINA Tinoco CNP    Name: Oz Ko   MRN: [id number]    Chief Complaint(s)     Chief Complaint   Patient presents with    Follow-up     2 month         History of Present Illness (HPI)     Oz Ko  is a(n)62 y.o. female with a hx of anemia, rheumatoid arthritis, atrial fibrillation with RVR, bladder incontinence, depression, GERD, hypokalemia, hypermobility syndrome, major depression disorder, migraines morbid obesity, [place] on CPAP, osteoarthritis, tibial plateau fracture, osteoporosis  here for the f/u evaluation of rheumatoid arthritis     Interval hx:    - open wound on knee is resolved   - current medications helping, but continued joint issues    pain affecting the shoulder, fingers, wrists, left hip  Pain on a scale 0-10: 4/10  Type of pain: constant  Timing: mornings  Aggravating factors: touching the joints  Alleviating factors: excedrin    Associated symptoms:  + swelling/  Redness/ warmth (right fingers), + AM stiffness lasting ~ several hours    REVIEW OF SYSTEMS: (ROS)    Review of Systems   Constitutional:  Positive for fatigue. Negative for fever and unexpected weight change. HENT:  Negative for congestion and trouble swallowing. Eyes:  Negative for pain and itching. Dry eyes   Respiratory:  Negative for cough and shortness of breath. Cardiovascular:  Negative for chest pain and leg swelling. Gastrointestinal:  Negative for abdominal pain, constipation, diarrhea and nausea. Endocrine: Negative for cold intolerance and heat intolerance. Genitourinary:  Negative for difficulty urinating, frequency and urgency. Musculoskeletal:  Positive for arthralgias, back pain, joint swelling, myalgias and neck pain. Skin:  Negative for rash and wound. Neurological:  Positive for weakness and headaches. Negative for dizziness and numbness.    Psychiatric/Behavioral:  The patient is nervous/anxious. PAST MEDICAL HISTORY      Past Medical History:   Diagnosis Date    Allergic rhinitis     Anxiety     Arthritis     Cataract     Depression     Hypertension     Insomnia     [place] (obstructive sleep apnea)     Osteoarthritis     Osteoporosis     Urinary incontinence        PAST SURGICAL HISTORY      Past Surgical History:   Procedure Laterality Date    ANKLE FRACTURE SURGERY      hardware placed    APPENDECTOMY      CHOLECYSTECTOMY      COLONOSCOPY      2014    CYST REMOVAL  1992    JOINT REPLACEMENT      rt    JOINT REPLACEMENT      x 2    TUBAL LIGATION      UPPER GASTROINTESTINAL ENDOSCOPY      2014       FAMILY HISTORY      Family History   Problem Relation Age of Onset    High Blood Pressure Father     Prostate Cancer Father     Colon Cancer Maternal Grandmother     Colon Polyps Maternal Grandmother     Other Brother         agent orange       SOCIAL HISTORY      Social History       Tobacco History       Smoking Status  Never Smoker      Smokeless Tobacco Use  Never Used              Alcohol History       Alcohol Use Status  No              Drug Use       Drug Use Status  No              Sexual Activity       Sexually Active  Not Asked                    ALLERGIES     Allergies   Allergen Reactions    Sulfamethoxazole-Trimethoprim Hives       CURRENT MEDICATIONS      Current Outpatient Medications   Medication Sig Dispense Refill    leflunomide (ARAVA) 20 MG tablet Take 1 tablet by mouth in the morning.  20 tablet 0    methotrexate (RHEUMATREX) 2.5 MG chemo tablet Take 7 tablets by mouth once a week Take 4 tablets in the morning and 3 tablets in the evening 28 tablet 0    cyanocobalamin 1000 MCG tablet Take 1,000 mcg by mouth daily      folic acid (FOLVITE) 1 MG tablet Take 1 tablet by mouth daily 90 tablet 1    amLODIPine (NORVASC) 2.5 MG tablet Take 1 tablet by mouth daily      busPIRone (BUSPAR) 15 MG tablet Take 1 tablet by mouth daily      gabapentin (NEURONTIN) 100 MG capsule Take 1 capsule by mouth 2 times daily. fluticasone (FLONASE) 50 MCG/ACT nasal spray 2 sprays by Nasal route daily 1 Bottle 8    buPROPion (WELLBUTRIN) 75 MG tablet Take 75 mg by mouth 2 times daily      oxybutynin (DITROPAN) 5 MG tablet Take 5 mg by mouth 2 times daily      ferrous sulfate 325 (65 FE) MG tablet Take 325 mg by mouth 2 times daily        No current facility-administered medications for this visit. PHYSICAL EXAMINATION / OBJECTIVE   Objective: There were no vitals taken for this visit. Physical Exam  Vitals reviewed. Constitutional:       Appearance: She is well-developed. Cardiovascular:      Rate and Rhythm: Normal rate and regular rhythm. Pulmonary:      Effort: Pulmonary effort is normal.      Breath sounds: Normal breath sounds. Musculoskeletal:      Cervical back: Normal range of motion and neck supple. Skin:     General: Skin is warm and dry. Findings: No rash. Neurological:      Mental Status: She is alert and oriented to person, place, and time. Psychiatric:         Thought Content: Thought content normal.     Upper extremities:    SHOULDERS + limited ROM bilat. Tender bilat. + swelling right  ELBOWS tender bilat,   WRISTS tender and swelling bilat,   HANDS/FINGERS tender and swelling right 2,3 MCPs. Tender bilat PIPs.  + swan neck changes, ulnar deviation, MCP subluxation    Lower extremities:  ANKLES nontender  FEET : nontender     LABS    CBC  Lab Results   Component Value Date/Time    WBC 5.59 06/28/2022 12:00 AM    RBC 3.91 06/28/2022 12:00 AM    HGB 12.3 06/28/2022 12:00 AM    HCT 37.4 06/28/2022 12:00 AM    MCV 95.7 06/28/2022 12:00 AM    MCH 31.5 06/28/2022 12:00 AM    MCHC 32.9 06/28/2022 12:00 AM    RDW 47.8 06/28/2022 12:00 AM     06/28/2022 12:00 AM       CMP  Lab Results   Component Value Date/Time    CALCIUM 9.4 06/28/2022 12:00 AM    LABALBU 3.1 06/28/2022 12:00 AM    PROT 6.1 05/26/2022 10:56 AM     06/28/2022 12:00 AM    K 3.9 06/28/2022 12:00 AM    CO2 27 06/28/2022 12:00 AM     06/28/2022 12:00 AM    BUN 24 06/28/2022 12:00 AM    CREATININE 0.9 06/28/2022 12:00 AM    ALKPHOS 107 06/28/2022 12:00 AM    ALT 19 06/28/2022 12:00 AM    AST 13 06/28/2022 12:00 AM       HgBA1c: No components found for: HGBA1C    No results found for: VITD25      No results found for: ANASCRN  No results found for: SSA  No results found for: SSB  No results found for: ANTI-SMITH  No results found for: DSDNAAB   No results found for: ANTIRNP  No results found for: C3, C4  No results found for: CCPAB  Lab Results   Component Value Date     12/10/2021       No components found for: CANCASCRN, APANCASCRN  Lab Results   Component Value Date    SEDRATE 44 06/28/2022     Lab Results   Component Value Date    CRP 5.8 06/28/2022       RADIOLOGY:        DEXA: 6/8/2021 ---    Lumbar spine L1-4: BMD: 0.876 g/cm², T score: -1.6  Right hip (total) BMD: 0.567 g/cm², T score: -2.1  Right hip (neck), BMD: 0.414, T score: -3.9    ASSESSMENT/PLAN    Assessment   Plan     Seropositive rheumatoid arthritis + RF > 650, +   - prior tx: methotrexate 17.5 mg weekly, plaquenil 200 mg BID, enbrel (recurrent MRSA infections). -methotrexate 17.5 mg PO weekly & folic acid 1 mg daily (restarted 2/10/22)   - arava 20 mg daily (6/2022)   - start orencia 125 mg subcu weekly. Side effects: increased risk respiratory tract infections; exacerbations of COPD. Risk of infection is less than other agents. Contraindications: COPD (can increase risk of exacerbation) . Osteoporosis  - prior femoral neck fracture. DEXA 6/2021 as above. - alendronate 70 mg weekly    Osteoarthritis of multiple joints   - continue excedrin PRN    Tarsal tunnel syndrome  - prior EMG- can be related to #1    Open wound of left knee- resolved    Leukopenia/lymphopenia  - neg CTD evaluation.  Can be related to rheumatoid arthritis (felty, T-LGL)    H/o substance abuse  - UDS in care everywhere w/

## 2022-11-23 ENCOUNTER — NURSE ONLY (OUTPATIENT)
Dept: LAB | Age: 62
End: 2022-11-23

## 2022-11-23 ENCOUNTER — OFFICE VISIT (OUTPATIENT)
Dept: RHEUMATOLOGY | Age: 62
End: 2022-11-23
Payer: COMMERCIAL

## 2022-11-23 VITALS
SYSTOLIC BLOOD PRESSURE: 126 MMHG | OXYGEN SATURATION: 97 % | HEART RATE: 88 BPM | HEIGHT: 64 IN | BODY MASS INDEX: 44.61 KG/M2 | DIASTOLIC BLOOD PRESSURE: 72 MMHG

## 2022-11-23 DIAGNOSIS — Z51.81 MEDICATION MONITORING ENCOUNTER: ICD-10-CM

## 2022-11-23 DIAGNOSIS — M81.0 OSTEOPOROSIS, UNSPECIFIED OSTEOPOROSIS TYPE, UNSPECIFIED PATHOLOGICAL FRACTURE PRESENCE: ICD-10-CM

## 2022-11-23 DIAGNOSIS — M15.9 OSTEOARTHRITIS OF MULTIPLE JOINTS, UNSPECIFIED OSTEOARTHRITIS TYPE: ICD-10-CM

## 2022-11-23 DIAGNOSIS — M05.9 SEROPOSITIVE RHEUMATOID ARTHRITIS (HCC): ICD-10-CM

## 2022-11-23 DIAGNOSIS — M05.9 SEROPOSITIVE RHEUMATOID ARTHRITIS (HCC): Primary | ICD-10-CM

## 2022-11-23 DIAGNOSIS — D72.810 LYMPHOPENIA: ICD-10-CM

## 2022-11-23 LAB
ALBUMIN SERPL-MCNC: 3.7 G/DL (ref 3.5–5.1)
ALP BLD-CCNC: 120 U/L (ref 38–126)
ALT SERPL-CCNC: 7 U/L (ref 11–66)
ANION GAP SERPL CALCULATED.3IONS-SCNC: 13 MEQ/L (ref 8–16)
AST SERPL-CCNC: 11 U/L (ref 5–40)
BASOPHILS # BLD: 0.6 %
BASOPHILS ABSOLUTE: 0 THOU/MM3 (ref 0–0.1)
BILIRUB SERPL-MCNC: 0.5 MG/DL (ref 0.3–1.2)
BUN BLDV-MCNC: 18 MG/DL (ref 7–22)
C-REACTIVE PROTEIN: 0.56 MG/DL (ref 0–1)
CALCIUM SERPL-MCNC: 9.7 MG/DL (ref 8.5–10.5)
CHLORIDE BLD-SCNC: 104 MEQ/L (ref 98–111)
CO2: 25 MEQ/L (ref 23–33)
CREAT SERPL-MCNC: 0.7 MG/DL (ref 0.4–1.2)
EOSINOPHIL # BLD: 3.3 %
EOSINOPHILS ABSOLUTE: 0.2 THOU/MM3 (ref 0–0.4)
ERYTHROCYTE [DISTWIDTH] IN BLOOD BY AUTOMATED COUNT: 14.1 % (ref 11.5–14.5)
ERYTHROCYTE [DISTWIDTH] IN BLOOD BY AUTOMATED COUNT: 50.9 FL (ref 35–45)
GFR SERPL CREATININE-BSD FRML MDRD: > 60 ML/MIN/1.73M2
GLUCOSE BLD-MCNC: 124 MG/DL (ref 70–108)
HCT VFR BLD CALC: 46.8 % (ref 37–47)
HEMOGLOBIN: 14.8 GM/DL (ref 12–16)
IMMATURE GRANS (ABS): 0.02 THOU/MM3 (ref 0–0.07)
IMMATURE GRANULOCYTES: 0.4 %
LYMPHOCYTES # BLD: 15.3 %
LYMPHOCYTES ABSOLUTE: 0.7 THOU/MM3 (ref 1–4.8)
MCH RBC QN AUTO: 31.3 PG (ref 26–33)
MCHC RBC AUTO-ENTMCNC: 31.6 GM/DL (ref 32.2–35.5)
MCV RBC AUTO: 98.9 FL (ref 81–99)
MONOCYTES # BLD: 10.2 %
MONOCYTES ABSOLUTE: 0.5 THOU/MM3 (ref 0.4–1.3)
NUCLEATED RED BLOOD CELLS: 0 /100 WBC
PLATELET # BLD: 327 THOU/MM3 (ref 130–400)
PMV BLD AUTO: 11 FL (ref 9.4–12.4)
POTASSIUM SERPL-SCNC: 4 MEQ/L (ref 3.5–5.2)
RBC # BLD: 4.73 MILL/MM3 (ref 4.2–5.4)
SEDIMENTATION RATE, ERYTHROCYTE: 27 MM/HR (ref 0–20)
SEG NEUTROPHILS: 70.2 %
SEGMENTED NEUTROPHILS ABSOLUTE COUNT: 3.4 THOU/MM3 (ref 1.8–7.7)
SODIUM BLD-SCNC: 142 MEQ/L (ref 135–145)
TOTAL PROTEIN: 6.4 G/DL (ref 6.1–8)
WBC # BLD: 4.9 THOU/MM3 (ref 4.8–10.8)

## 2022-11-23 PROCEDURE — 3078F DIAST BP <80 MM HG: CPT | Performed by: INTERNAL MEDICINE

## 2022-11-23 PROCEDURE — G8427 DOCREV CUR MEDS BY ELIG CLIN: HCPCS | Performed by: INTERNAL MEDICINE

## 2022-11-23 PROCEDURE — 3074F SYST BP LT 130 MM HG: CPT | Performed by: INTERNAL MEDICINE

## 2022-11-23 PROCEDURE — 1036F TOBACCO NON-USER: CPT | Performed by: INTERNAL MEDICINE

## 2022-11-23 PROCEDURE — 99214 OFFICE O/P EST MOD 30 MIN: CPT | Performed by: INTERNAL MEDICINE

## 2022-11-23 PROCEDURE — G8484 FLU IMMUNIZE NO ADMIN: HCPCS | Performed by: INTERNAL MEDICINE

## 2022-11-23 PROCEDURE — G8417 CALC BMI ABV UP PARAM F/U: HCPCS | Performed by: INTERNAL MEDICINE

## 2022-11-23 PROCEDURE — 3017F COLORECTAL CA SCREEN DOC REV: CPT | Performed by: INTERNAL MEDICINE

## 2022-11-23 ASSESSMENT — ENCOUNTER SYMPTOMS
EYE PAIN: 0
BACK PAIN: 1
DIARRHEA: 0
ABDOMINAL PAIN: 0
NAUSEA: 0
TROUBLE SWALLOWING: 0
SHORTNESS OF BREATH: 0
COUGH: 0
EYE ITCHING: 0
CONSTIPATION: 0

## 2022-11-23 ASSESSMENT — JOINT PAIN
TOTAL NUMBER OF SWOLLEN JOINTS: 0
TOTAL NUMBER OF TENDER JOINTS: 8

## 2022-11-23 NOTE — PROGRESS NOTES
[facility] RHEUMATOLOGY FOLLOW UP NOTE       Date Of Service: 11/23/2022  Provider: Tenisha Meza DO    Name: Eliezer Dorsey   MRN: [id number]    Chief Complaint(s)     Chief Complaint   Patient presents with    Follow-up     4 month follow up        History of Present Illness (HPI)     Eliezer Dorsey  is a(n)62 y.o. female with a hx of anemia, rheumatoid arthritis, atrial fibrillation with RVR, bladder incontinence, depression, GERD, hypokalemia, hypermobility syndrome, major depression disorder, migraines morbid obesity, [place] on CPAP, osteoarthritis, tibial plateau fracture, osteoporosis  here for the f/u evaluation of rheumatoid arthritis , osteoarthritis of multiple joints,     Interval hx:   -- orencia started 4 to 6 weeks ago w/ decreased joint pains and tolerating injection. -- off the Methotrexate and arava currently b/c of needing labs. -- currently constant variabe painin the hands and left leg. Throbbing and stiffness of the fingers. pain up to  8 to 10 /10 over the past week in the left hip/thigh  aggravated w/ movement activity, sitting on the hip. Timing: mornings  Aggravating factors: touching the joints Alleviating factors: excedrin, movement     Ayde joint swelling  AM stiffness lasting all day      osteoporosis --- reported compliance with alendronate. Wheelchair bound, denies vision changes. REVIEW OF SYSTEMS: (ROS)    Review of Systems   Constitutional:  Positive for fatigue. Negative for fever and unexpected weight change. HENT:  Negative for congestion and trouble swallowing. Eyes:  Negative for pain and itching. Dry eyes   Respiratory:  Negative for cough and shortness of breath. Cardiovascular:  Negative for chest pain and leg swelling. Gastrointestinal:  Negative for abdominal pain, constipation, diarrhea and nausea. Endocrine: Negative for cold intolerance and heat intolerance.    Genitourinary:  Negative for difficulty urinating, frequency and urgency. Musculoskeletal:  Positive for arthralgias, back pain, joint swelling, myalgias and neck pain. Skin:  Negative for rash and wound. Neurological:  Positive for weakness and headaches. Negative for dizziness and numbness. Psychiatric/Behavioral:  The patient is nervous/anxious. PAST MEDICAL HISTORY      Past Medical History:   Diagnosis Date    Allergic rhinitis     Anxiety     Arthritis     Cataract     Depression     Hypertension     Insomnia     [place] (obstructive sleep apnea)     Osteoarthritis     Osteoporosis     Urinary incontinence        PAST SURGICAL HISTORY      Past Surgical History:   Procedure Laterality Date    ANKLE FRACTURE SURGERY      hardware placed    APPENDECTOMY      CHOLECYSTECTOMY      COLONOSCOPY      2014    CYST REMOVAL  1992    JOINT REPLACEMENT      rt    JOINT REPLACEMENT      x 2    TUBAL LIGATION      UPPER GASTROINTESTINAL ENDOSCOPY      2014       FAMILY HISTORY      Family History   Problem Relation Age of Onset    High Blood Pressure Father     Prostate Cancer Father     Colon Cancer Maternal Grandmother     Colon Polyps Maternal Grandmother     Other Brother         agent orange       SOCIAL HISTORY      Social History     Tobacco Use    Smoking status: Never    Smokeless tobacco: Never   Substance Use Topics    Alcohol use: No    Drug use: No     ALLERGIES     Allergies   Allergen Reactions    Sulfamethoxazole-Trimethoprim Hives       CURRENT MEDICATIONS      Current Outpatient Medications   Medication Sig Dispense Refill    Abatacept (ORENCIA CLICKJECT) 649 MG/ML SOAJ Inject 125 mg into the skin every 7 days 4 mL 5    leflunomide (ARAVA) 20 MG tablet Take 1 tablet by mouth in the morning.  20 tablet 0    methotrexate (RHEUMATREX) 2.5 MG chemo tablet Take 7 tablets by mouth once a week Take 4 tablets in the morning and 3 tablets in the evening 28 tablet 0    cyanocobalamin 1000 MCG tablet Take 1,000 mcg by mouth daily      folic acid (FOLVITE) 1 MG tablet Take 1 tablet by mouth daily 90 tablet 1    amLODIPine (NORVASC) 2.5 MG tablet Take 1 tablet by mouth daily      busPIRone (BUSPAR) 15 MG tablet Take 1 tablet by mouth daily      gabapentin (NEURONTIN) 100 MG capsule Take 1 capsule by mouth 2 times daily. buPROPion (WELLBUTRIN) 75 MG tablet Take 75 mg by mouth 2 times daily      oxybutynin (DITROPAN) 5 MG tablet Take 5 mg by mouth 2 times daily      ferrous sulfate 325 (65 FE) MG tablet Take 325 mg by mouth 2 times daily       fluticasone (FLONASE) 50 MCG/ACT nasal spray 2 sprays by Nasal route daily 1 Bottle 8     No current facility-administered medications for this visit. PHYSICAL EXAMINATION / OBJECTIVE   Objective:  /72 (Site: Right Lower Arm, Position: Sitting, Cuff Size: Large Adult)   Pulse 88   Ht 5' 4.02\" (1.626 m)   SpO2 97%   BMI 44.61 kg/m²     Physical Exam  Vitals reviewed. Constitutional:       Appearance: She is well-developed. Cardiovascular:      Rate and Rhythm: Normal rate and regular rhythm. Pulmonary:      Effort: Pulmonary effort is normal.      Breath sounds: Normal breath sounds. Musculoskeletal:      Cervical back: Normal range of motion and neck supple. Skin:     General: Skin is warm and dry. Findings: No rash. Neurological:      Mental Status: She is alert and oriented to person, place, and time. Psychiatric:         Thought Content: Thought content normal.     Upper extremities:    SHOULDERS + limited ROM bilat. Tender bilat. ELBOWS tender left   WRISTS  Limited and painful ROm. HANDS/FINGERS   -  MCP w/ volar subluxation, sawn neck changes. - flexion deformities of the fingers bilat   - tender left 4-5 pips, right 3rd pip.    - fullness along the mcps bilat       Lower extremities:  ANKLES nontender  FEET : nontender      Physical Exam        SANDHU-28 (ESR): 4.37 (Moderate disease activity)  SANDHU-28 (CRP): 3.37 (Moderate disease activity)          LABS    CBC  Lab Results   Component Value Date/Time    WBC 5.59 06/28/2022 12:00 AM    RBC 3.91 06/28/2022 12:00 AM    HGB 12.3 06/28/2022 12:00 AM    HCT 37.4 06/28/2022 12:00 AM    MCV 95.7 06/28/2022 12:00 AM    MCH 31.5 06/28/2022 12:00 AM    MCHC 32.9 06/28/2022 12:00 AM    RDW 47.8 06/28/2022 12:00 AM     06/28/2022 12:00 AM       CMP  Lab Results   Component Value Date/Time    CALCIUM 9.4 06/28/2022 12:00 AM    LABALBU 3.1 06/28/2022 12:00 AM    PROT 6.1 05/26/2022 10:56 AM     06/28/2022 12:00 AM    K 3.9 06/28/2022 12:00 AM    CO2 27 06/28/2022 12:00 AM     06/28/2022 12:00 AM    BUN 24 06/28/2022 12:00 AM    CREATININE 0.9 06/28/2022 12:00 AM    ALKPHOS 107 06/28/2022 12:00 AM    ALT 19 06/28/2022 12:00 AM    AST 13 06/28/2022 12:00 AM       Lab Results   Component Value Date     12/10/2021     Lab Results   Component Value Date    SEDRATE 44 06/28/2022     Lab Results   Component Value Date    CRP 5.8 06/28/2022       RADIOLOGY:        DEXA: 6/8/2021 ---    Lumbar spine L1-4: BMD: 0.876 g/cm², T score: -1.6  Right hip (total) BMD: 0.567 g/cm², T score: -2.1  Right hip (neck), BMD: 0.414, T score: -3.9    ASSESSMENT/PLAN    Assessment   Plan     Seropositive rheumatoid arthritis  -- moderate dz activity - has been of arava and Methotrexate recently , and only recently started [facility]. - holding on medication changes at this time   -- + RF > 650, +   - prior tx: methotrexate 17.5 mg weekly, plaquenil 200 mg BID, enbrel (recurrent MRSA infections). - methotrexate 17.5 mg PO weekly & folic acid 1 mg daily (restarted 2/10/22)   - arava 20 mg daily (6/2022)   - orencia 125 mg subcu weekly. (October 2022)     Osteoporosis  - prior femoral neck fracture. DEXA 6/2021 as above. - alendronate 70 mg weekly    Osteoarthritis of multiple joints   - continue excedrin PRN    Tarsal tunnel syndrome - denies numbness of the feet.      Leukopenia/lymphopenia --- resolved on the last evaluation   - neg CTD evaluation.   - Can be related to rheumatoid arthritis (felty, T-SARAHI)    H/o substance abuse  - UDS in care everywhere w/ opiate and amphetamine use    Medication monitoring   - CBC, CMP, sed rate, CRP q 8 wks. - TB gold negative (1/2022)      Return in about 2 months (around 1/23/2023).       Electronically signed by Kimmie Perez DO on 11/23/2022 at 11:25 AM

## 2022-12-05 DIAGNOSIS — M05.9 SEROPOSITIVE RHEUMATOID ARTHRITIS (HCC): ICD-10-CM

## 2022-12-05 NOTE — TELEPHONE ENCOUNTER
Marisela Salazar called requesting a refill on the following medications:  Requested Prescriptions     Pending Prescriptions Disp Refills    methotrexate (RHEUMATREX) 2.5 MG chemo tablet 28 tablet 0     Sig: Take 7 tablets by mouth once a week Take 4 tablets in the morning and 3 tablets in the evening     [facility]   .       Date of last visit:   Date of next visit (if applicable): 3/87/2850

## 2022-12-05 NOTE — TELEPHONE ENCOUNTER
DOLV: 11/23/22  DONV: 1/24/23  LAST LAB DRAW: 11/23/22  LAST TB TEST: 1/12/22    Lab Results   Component Value Date     11/23/2022    K 4.0 11/23/2022     11/23/2022    CO2 25 11/23/2022    BUN 18 11/23/2022    CREATININE 0.7 11/23/2022    GLUCOSE 124 (H) 11/23/2022    CALCIUM 9.7 11/23/2022    PROT 6.4 11/23/2022    LABALBU 3.7 11/23/2022    BILITOT 0.5 11/23/2022    ALKPHOS 120 11/23/2022    AST 11 11/23/2022    ALT 7 (L) 11/23/2022    LABGLOM >60 11/23/2022       Recent Labs     11/23/22  1026   WBC 4.9   HGB 14.8   HCT 46.8   MCV 98.9          Lab Results   Component Value Date    SEDRATE 27 (H) 11/23/2022       Lab Results   Component Value Date    CRP 0.56 11/23/2022

## 2023-01-01 LAB
A/G RATIO: 1.9
ALBUMIN SERPL-MCNC: 3.8 G/DL
ALP BLD-CCNC: 105 U/L
ALT SERPL-CCNC: 13 U/L
AST SERPL-CCNC: 13 U/L
BILIRUB SERPL-MCNC: 0.3 MG/DL (ref 0.1–1.4)
BILIRUBIN DIRECT: 0.2 MG/DL
BILIRUBIN, INDIRECT: NORMAL
GLOBULIN: 2
PROTEIN TOTAL: 5.8 G/DL

## 2023-01-03 ENCOUNTER — TELEPHONE (OUTPATIENT)
Dept: RHEUMATOLOGY | Age: 63
End: 2023-01-03

## 2023-01-03 NOTE — TELEPHONE ENCOUNTER
John from [facility] called needing clarifiation on MTX. Called and spoke with her and informed she is to take 7 tablets once weekly, 4 in the morning, 3 in the evening. Stated understanding.

## 2023-01-09 DIAGNOSIS — M05.9 SEROPOSITIVE RHEUMATOID ARTHRITIS (HCC): ICD-10-CM

## 2023-01-09 DIAGNOSIS — Z51.81 MEDICATION MONITORING ENCOUNTER: Primary | ICD-10-CM

## 2023-01-09 RX ORDER — METHOTREXATE 2.5 MG/1
TABLET ORAL
Qty: 28 TABLET | Refills: 0 | OUTPATIENT
Start: 2023-01-09

## 2023-01-23 ENCOUNTER — TELEPHONE (OUTPATIENT)
Dept: RHEUMATOLOGY | Age: 63
End: 2023-01-23

## 2023-01-23 NOTE — TELEPHONE ENCOUNTER
Patient has not been taking arava since Nov. 9. She wanted to make you aware of this. She plans to get her labs prior to her appointment tomorrow.

## 2023-01-24 ENCOUNTER — OFFICE VISIT (OUTPATIENT)
Dept: RHEUMATOLOGY | Age: 63
End: 2023-01-24
Payer: COMMERCIAL

## 2023-01-24 ENCOUNTER — TELEPHONE (OUTPATIENT)
Dept: RHEUMATOLOGY | Age: 63
End: 2023-01-24

## 2023-01-24 ENCOUNTER — NURSE ONLY (OUTPATIENT)
Dept: LAB | Age: 63
End: 2023-01-24

## 2023-01-24 VITALS
HEIGHT: 64 IN | BODY MASS INDEX: 44.39 KG/M2 | DIASTOLIC BLOOD PRESSURE: 78 MMHG | WEIGHT: 260 LBS | OXYGEN SATURATION: 91 % | SYSTOLIC BLOOD PRESSURE: 124 MMHG | HEART RATE: 94 BPM

## 2023-01-24 DIAGNOSIS — M05.9 SEROPOSITIVE RHEUMATOID ARTHRITIS (HCC): ICD-10-CM

## 2023-01-24 DIAGNOSIS — D72.810 LYMPHOPENIA: ICD-10-CM

## 2023-01-24 DIAGNOSIS — G57.50 TARSAL TUNNEL SYNDROME, UNSPECIFIED LATERALITY: ICD-10-CM

## 2023-01-24 DIAGNOSIS — M15.9 OSTEOARTHRITIS OF MULTIPLE JOINTS, UNSPECIFIED OSTEOARTHRITIS TYPE: ICD-10-CM

## 2023-01-24 DIAGNOSIS — Z51.81 MEDICATION MONITORING ENCOUNTER: ICD-10-CM

## 2023-01-24 DIAGNOSIS — M81.0 OSTEOPOROSIS, UNSPECIFIED OSTEOPOROSIS TYPE, UNSPECIFIED PATHOLOGICAL FRACTURE PRESENCE: ICD-10-CM

## 2023-01-24 DIAGNOSIS — M05.9 SEROPOSITIVE RHEUMATOID ARTHRITIS (HCC): Primary | ICD-10-CM

## 2023-01-24 LAB
ALBUMIN SERPL BCG-MCNC: 4 G/DL (ref 3.5–5.1)
ALP SERPL-CCNC: 149 U/L (ref 38–126)
ALT SERPL W/O P-5'-P-CCNC: 23 U/L (ref 11–66)
ANION GAP SERPL CALC-SCNC: 12 MEQ/L (ref 8–16)
AST SERPL-CCNC: 26 U/L (ref 5–40)
BASOPHILS ABSOLUTE: 0 THOU/MM3 (ref 0–0.1)
BASOPHILS NFR BLD AUTO: 1 %
BILIRUB SERPL-MCNC: 0.5 MG/DL (ref 0.3–1.2)
BUN SERPL-MCNC: 21 MG/DL (ref 7–22)
CALCIUM SERPL-MCNC: 9.5 MG/DL (ref 8.5–10.5)
CHLORIDE SERPL-SCNC: 106 MEQ/L (ref 98–111)
CO2 SERPL-SCNC: 26 MEQ/L (ref 23–33)
CREAT SERPL-MCNC: 0.8 MG/DL (ref 0.4–1.2)
CRP SERPL-MCNC: < 0.3 MG/DL (ref 0–1)
DEPRECATED RDW RBC AUTO: 50.6 FL (ref 35–45)
EOSINOPHIL NFR BLD AUTO: 3.4 %
EOSINOPHILS ABSOLUTE: 0.2 THOU/MM3 (ref 0–0.4)
ERYTHROCYTE [DISTWIDTH] IN BLOOD BY AUTOMATED COUNT: 14.3 % (ref 11.5–14.5)
ERYTHROCYTE [SEDIMENTATION RATE] IN BLOOD BY WESTERGREN METHOD: 34 MM/HR (ref 0–20)
GFR SERPL CREATININE-BSD FRML MDRD: > 60 ML/MIN/1.73M2
GLUCOSE SERPL-MCNC: 125 MG/DL (ref 70–108)
HCT VFR BLD AUTO: 45.9 % (ref 37–47)
HGB BLD-MCNC: 14.7 GM/DL (ref 12–16)
IMM GRANULOCYTES # BLD AUTO: 0.01 THOU/MM3 (ref 0–0.07)
IMM GRANULOCYTES NFR BLD AUTO: 0.2 %
LYMPHOCYTES ABSOLUTE: 0.6 THOU/MM3 (ref 1–4.8)
LYMPHOCYTES NFR BLD AUTO: 12.1 %
MCH RBC QN AUTO: 31.8 PG (ref 26–33)
MCHC RBC AUTO-ENTMCNC: 32 GM/DL (ref 32.2–35.5)
MCV RBC AUTO: 99.4 FL (ref 81–99)
MONOCYTES ABSOLUTE: 0.7 THOU/MM3 (ref 0.4–1.3)
MONOCYTES NFR BLD AUTO: 15.2 %
NEUTROPHILS NFR BLD AUTO: 68.1 %
NRBC BLD AUTO-RTO: 0 /100 WBC
PLATELET # BLD AUTO: 332 THOU/MM3 (ref 130–400)
PMV BLD AUTO: 10.7 FL (ref 9.4–12.4)
POTASSIUM SERPL-SCNC: 4.3 MEQ/L (ref 3.5–5.2)
PROT SERPL-MCNC: 6.5 G/DL (ref 6.1–8)
RBC # BLD AUTO: 4.62 MILL/MM3 (ref 4.2–5.4)
SEGMENTED NEUTROPHILS ABSOLUTE COUNT: 3.3 THOU/MM3 (ref 1.8–7.7)
SODIUM SERPL-SCNC: 144 MEQ/L (ref 135–145)
WBC # BLD AUTO: 4.9 THOU/MM3 (ref 4.8–10.8)

## 2023-01-24 PROCEDURE — G8484 FLU IMMUNIZE NO ADMIN: HCPCS | Performed by: NURSE PRACTITIONER

## 2023-01-24 PROCEDURE — 1036F TOBACCO NON-USER: CPT | Performed by: NURSE PRACTITIONER

## 2023-01-24 PROCEDURE — 99214 OFFICE O/P EST MOD 30 MIN: CPT | Performed by: NURSE PRACTITIONER

## 2023-01-24 PROCEDURE — 3074F SYST BP LT 130 MM HG: CPT | Performed by: NURSE PRACTITIONER

## 2023-01-24 PROCEDURE — 3078F DIAST BP <80 MM HG: CPT | Performed by: NURSE PRACTITIONER

## 2023-01-24 PROCEDURE — 3017F COLORECTAL CA SCREEN DOC REV: CPT | Performed by: NURSE PRACTITIONER

## 2023-01-24 PROCEDURE — G8427 DOCREV CUR MEDS BY ELIG CLIN: HCPCS | Performed by: NURSE PRACTITIONER

## 2023-01-24 PROCEDURE — G8417 CALC BMI ABV UP PARAM F/U: HCPCS | Performed by: NURSE PRACTITIONER

## 2023-01-24 RX ORDER — LEFLUNOMIDE 20 MG/1
20 TABLET ORAL DAILY
Qty: 30 TABLET | Refills: 0 | Status: SHIPPED | OUTPATIENT
Start: 2023-01-24

## 2023-01-24 ASSESSMENT — ENCOUNTER SYMPTOMS
CONSTIPATION: 0
TROUBLE SWALLOWING: 0
DIARRHEA: 0
BACK PAIN: 1
NAUSEA: 0
ABDOMINAL PAIN: 0
COUGH: 0
SHORTNESS OF BREATH: 0
EYE ITCHING: 0
EYE PAIN: 0

## 2023-01-24 NOTE — TELEPHONE ENCOUNTER
----- Message from CAROLINA Arizmendi CNP sent at 1/24/2023  3:44 PM EST -----  Blood testing with a worsened low lymphocyte count. Please restart the arava and methotrexate and please repeat blood counts in 4 weeks.

## 2023-01-24 NOTE — PROGRESS NOTES
[facility] RHEUMATOLOGY FOLLOW UP NOTE       Date Of Service: 1/24/2023  Provider: CAROLINA Daley - CNP    Name: Poonam Montiel   MRN: [id number]    Chief Complaint(s)     Chief Complaint   Patient presents with    Follow-up     2 month follow up        History of Present Illness (HPI)     Poonam Montiel  is a(n)62 y.o. female with a hx of anemia, rheumatoid arthritis, atrial fibrillation with RVR, bladder incontinence, depression, GERD, hypokalemia, hypermobility syndrome, major depression disorder, migraines morbid obesity, [place] on CPAP, osteoarthritis, tibial plateau fracture, osteoporosis  here for the f/u evaluation of rheumatoid arthritis     Interval hx:    - off methotrexate and arava due to running out of medications    pain affecting the hands, left knee  Pain on a scale 0-10: 8/10  Type of pain: constant  Timing: mornings  Aggravating factors: touching the joints  Alleviating factors: excedrin    Associated symptoms:  + swelling/  Redness/ warmth (left foot), + AM stiffness lasting ~ 1 hour    REVIEW OF SYSTEMS: (ROS)    Review of Systems   Constitutional:  Positive for fatigue. Negative for fever and unexpected weight change. HENT:  Negative for congestion and trouble swallowing. Eyes:  Negative for pain and itching. Dry eyes   Respiratory:  Negative for cough and shortness of breath. Cardiovascular:  Negative for chest pain and leg swelling. Gastrointestinal:  Negative for abdominal pain, constipation, diarrhea and nausea. Endocrine: Negative for cold intolerance and heat intolerance. Genitourinary:  Negative for difficulty urinating, frequency and urgency. Musculoskeletal:  Positive for arthralgias, back pain, joint swelling, myalgias and neck pain. Skin:  Negative for rash and wound. Neurological:  Positive for weakness and headaches. Negative for dizziness and numbness. Psychiatric/Behavioral:  The patient is nervous/anxious.       PAST MEDICAL HISTORY Past Medical History:   Diagnosis Date    Allergic rhinitis     Anxiety     Arthritis     Cataract     Depression     Hypertension     Insomnia     [place] (obstructive sleep apnea)     Osteoarthritis     Osteoporosis     Urinary incontinence        PAST SURGICAL HISTORY      Past Surgical History:   Procedure Laterality Date    ANKLE FRACTURE SURGERY      hardware placed    APPENDECTOMY      CHOLECYSTECTOMY      COLONOSCOPY      2014    CYST REMOVAL  1992    JOINT REPLACEMENT      rt    JOINT REPLACEMENT      x 2    TUBAL LIGATION      UPPER GASTROINTESTINAL ENDOSCOPY      2014       FAMILY HISTORY      Family History   Problem Relation Age of Onset    High Blood Pressure Father     Prostate Cancer Father     Colon Cancer Maternal Grandmother     Colon Polyps Maternal Grandmother     Other Brother         agent orange       SOCIAL HISTORY      Social History       Tobacco History       Smoking Status  Never Smoker      Smokeless Tobacco Use  Never Used              Alcohol History       Alcohol Use Status  No              Drug Use       Drug Use Status  No              Sexual Activity       Sexually Active  Not Asked                    ALLERGIES     Allergies   Allergen Reactions    Sulfamethoxazole-Trimethoprim Hives       CURRENT MEDICATIONS      Current Outpatient Medications   Medication Sig Dispense Refill    methotrexate (RHEUMATREX) 2.5 MG chemo tablet Take 7 tablets by mouth once a week Take 4 tablets in the morning and 3 tablets in the evening 28 tablet 0    Abatacept (ORENCIA CLICKJECT) 704 MG/ML SOAJ Inject 125 mg into the skin every 7 days 4 mL 5    leflunomide (ARAVA) 20 MG tablet Take 1 tablet by mouth in the morning.  20 tablet 0    cyanocobalamin 1000 MCG tablet Take 1,000 mcg by mouth daily      folic acid (FOLVITE) 1 MG tablet Take 1 tablet by mouth daily 90 tablet 1    amLODIPine (NORVASC) 2.5 MG tablet Take 1 tablet by mouth daily      busPIRone (BUSPAR) 15 MG tablet Take 1 tablet by mouth daily     gabapentin (NEURONTIN) 100 MG capsule Take 1 capsule by mouth 2 times daily.      buPROPion (WELLBUTRIN) 75 MG tablet Take 75 mg by mouth 2 times daily      oxybutynin (DITROPAN) 5 MG tablet Take 5 mg by mouth 2 times daily      ferrous sulfate 325 (65 FE) MG tablet Take 325 mg by mouth 2 times daily       fluticasone (FLONASE) 50 MCG/ACT nasal spray 2 sprays by Nasal route daily 1 Bottle 8     No current facility-administered medications for this visit.       PHYSICAL EXAMINATION / OBJECTIVE   Objective:  /78 (Site: Left Lower Arm, Position: Sitting, Cuff Size: Large Adult)   Pulse 94   Ht 5' 4.02\" (1.626 m)   Wt 260 lb (117.9 kg)   SpO2 91%   BMI 44.61 kg/m²     Physical Exam  Vitals reviewed.   Constitutional:       Appearance: She is well-developed.   Cardiovascular:      Rate and Rhythm: Normal rate and regular rhythm.   Pulmonary:      Effort: Pulmonary effort is normal.      Breath sounds: Normal breath sounds.   Musculoskeletal:      Cervical back: Normal range of motion and neck supple.   Skin:     General: Skin is warm and dry.      Findings: No rash.   Neurological:      Mental Status: She is alert and oriented to person, place, and time.   Psychiatric:         Thought Content: Thought content normal.     Upper extremities:    SHOULDERS + limited ROM bilat. Tender bilat  ELBOWS nontender  WRISTS painful and limited ROM  HANDS/FINGERS    MCPs + fullness bilatvolar subluxation, swan neck changes.    PIPs tender right 3rd, left 4th   - flexion deformities of bilat fingers    Lower extremities:  ANKLES nontender  FEET : nontender     LABS    CBC  Lab Results   Component Value Date/Time    WBC 4.9 11/23/2022 10:26 AM    RBC 4.73 11/23/2022 10:26 AM    HGB 14.8 11/23/2022 10:26 AM    HCT 46.8 11/23/2022 10:26 AM    MCV 98.9 11/23/2022 10:26 AM    MCH 31.3 11/23/2022 10:26 AM    MCHC 31.6 11/23/2022 10:26 AM    RDW 47.8 06/28/2022 12:00 AM     11/23/2022 10:26 AM       CMP  Lab Results  Component Value Date/Time    CALCIUM 9.7 11/23/2022 10:26 AM    LABALBU 3.7 11/23/2022 10:26 AM    PROT 6.4 11/23/2022 10:26 AM     11/23/2022 10:26 AM    K 4.0 11/23/2022 10:26 AM    CO2 25 11/23/2022 10:26 AM     11/23/2022 10:26 AM    BUN 18 11/23/2022 10:26 AM    CREATININE 0.7 11/23/2022 10:26 AM    ALKPHOS 120 11/23/2022 10:26 AM    ALT 7 11/23/2022 10:26 AM    AST 11 11/23/2022 10:26 AM       HgBA1c: No components found for: HGBA1C    No results found for: VITD25      No results found for: ANASCRN  No results found for: SSA  No results found for: SSB  No results found for: ANTI-SMITH  No results found for: DSDNAAB   No results found for: ANTIRNP  No results found for: C3, C4  No results found for: CCPAB  Lab Results   Component Value Date     12/10/2021       No components found for: CANCASCRN, APANCASCRN  Lab Results   Component Value Date    SEDRATE 27 (H) 11/23/2022     Lab Results   Component Value Date    CRP 0.56 11/23/2022       RADIOLOGY:        DEXA: 6/8/2021 ---    Lumbar spine L1-4: BMD: 0.876 g/cm², T score: -1.6  Right hip (total) BMD: 0.567 g/cm², T score: -2.1  Right hip (neck), BMD: 0.414, T score: -3.9    ASSESSMENT/PLAN    Assessment   Plan     Seropositive rheumatoid arthritis + RF > 650, +   - prior tx: methotrexate 17.5 mg weekly, plaquenil 200 mg BID, enbrel (recurrent MRSA infections). - restart methotrexate 17.5 mg PO weekly & folic acid 1 mg daily (restarted 2/10/22) if labs stable   - restart arava 20 mg daily (6/2022) if labs stable   - orencia 125 mg subcu weekly (10/2022)    Osteoporosis  - prior femoral neck fracture. DEXA 6/2021 as above. - alendronate 70 mg weekly    Osteoarthritis of multiple joints   - continue excedrin PRN    Tarsal tunnel syndrome  - prior EMG- can be related to #1    Leukopenia/lymphopenia  - neg CTD evaluation.  Can be related to rheumatoid arthritis (felty, T-LGL)    H/o substance abuse  - UDS in care everywhere w/ opiate and amphetamine use    Medication monitoring   - CBC, CMP, sed rate, CRP q 12 weeks if stable   - TB gold negative (1/2022)      No follow-ups on file. Electronically signed by CAROLINA Wellington CNP on 1/24/2023 at 12:51 PM    New Prescriptions    No medications on file       Thank you for allowing me to participate in the care of this patient. Please call if there are any questions.

## 2023-02-06 DIAGNOSIS — M05.9 SEROPOSITIVE RHEUMATOID ARTHRITIS (HCC): ICD-10-CM

## 2023-02-06 RX ORDER — METHOTREXATE 2.5 MG/1
TABLET ORAL
Qty: 28 TABLET | Refills: 0 | OUTPATIENT
Start: 2023-02-06

## 2023-02-13 ENCOUNTER — TELEPHONE (OUTPATIENT)
Dept: RHEUMATOLOGY | Age: 63
End: 2023-02-13

## 2023-02-13 NOTE — TELEPHONE ENCOUNTER
Mindy Simon the patients home health nurse called and stated the pt will need a refill of mtx. She was advise that the patient does need labs prior.

## 2023-02-14 ENCOUNTER — TELEPHONE (OUTPATIENT)
Dept: RHEUMATOLOGY | Age: 63
End: 2023-02-14

## 2023-02-14 NOTE — TELEPHONE ENCOUNTER
Surinder Alegre called the pharmacy and the patient medication has not been picked. The pharmacist did clarify with Surinder Alegre that it will be delivered tomorrow.

## 2023-02-14 NOTE — TELEPHONE ENCOUNTER
Isela Diggs ([facility]), patients friend.  Will be reaching out the the pharmacy to check if the script for mtx was retrieved on the Jan. 24

## 2023-02-22 ENCOUNTER — NURSE ONLY (OUTPATIENT)
Dept: LAB | Age: 63
End: 2023-02-22

## 2023-02-22 DIAGNOSIS — Z51.81 MEDICATION MONITORING ENCOUNTER: ICD-10-CM

## 2023-02-22 DIAGNOSIS — M05.9 SEROPOSITIVE RHEUMATOID ARTHRITIS (HCC): ICD-10-CM

## 2023-02-22 LAB
ALBUMIN SERPL BCG-MCNC: 3.5 G/DL (ref 3.5–5.1)
ALP SERPL-CCNC: 149 U/L (ref 38–126)
ALT SERPL W/O P-5'-P-CCNC: 25 U/L (ref 11–66)
ANION GAP SERPL CALC-SCNC: 13 MEQ/L (ref 8–16)
AST SERPL-CCNC: 31 U/L (ref 5–40)
BASOPHILS ABSOLUTE: 0 THOU/MM3 (ref 0–0.1)
BASOPHILS NFR BLD AUTO: 0.4 %
BILIRUB SERPL-MCNC: 0.4 MG/DL (ref 0.3–1.2)
BUN SERPL-MCNC: 26 MG/DL (ref 7–22)
CALCIUM SERPL-MCNC: 9.4 MG/DL (ref 8.5–10.5)
CHLORIDE SERPL-SCNC: 105 MEQ/L (ref 98–111)
CO2 SERPL-SCNC: 25 MEQ/L (ref 23–33)
CREAT SERPL-MCNC: 0.7 MG/DL (ref 0.4–1.2)
CRP SERPL-MCNC: < 0.3 MG/DL (ref 0–1)
DEPRECATED RDW RBC AUTO: 59.2 FL (ref 35–45)
EOSINOPHIL NFR BLD AUTO: 2.3 %
EOSINOPHILS ABSOLUTE: 0.1 THOU/MM3 (ref 0–0.4)
ERYTHROCYTE [DISTWIDTH] IN BLOOD BY AUTOMATED COUNT: 15.5 % (ref 11.5–14.5)
ERYTHROCYTE [SEDIMENTATION RATE] IN BLOOD BY WESTERGREN METHOD: 32 MM/HR (ref 0–20)
GFR SERPL CREATININE-BSD FRML MDRD: > 60 ML/MIN/1.73M2
GLUCOSE SERPL-MCNC: 101 MG/DL (ref 70–108)
HCT VFR BLD AUTO: 47.7 % (ref 37–47)
HGB BLD-MCNC: 14.6 GM/DL (ref 12–16)
IMM GRANULOCYTES # BLD AUTO: 0.02 THOU/MM3 (ref 0–0.07)
IMM GRANULOCYTES NFR BLD AUTO: 0.4 %
LYMPHOCYTES ABSOLUTE: 0.5 THOU/MM3 (ref 1–4.8)
LYMPHOCYTES NFR BLD AUTO: 9.5 %
MCH RBC QN AUTO: 32.4 PG (ref 26–33)
MCHC RBC AUTO-ENTMCNC: 30.6 GM/DL (ref 32.2–35.5)
MCV RBC AUTO: 105.8 FL (ref 81–99)
MONOCYTES ABSOLUTE: 0.8 THOU/MM3 (ref 0.4–1.3)
MONOCYTES NFR BLD AUTO: 15.9 %
NEUTROPHILS NFR BLD AUTO: 71.5 %
NRBC BLD AUTO-RTO: 0 /100 WBC
PLATELET # BLD AUTO: 262 THOU/MM3 (ref 130–400)
PMV BLD AUTO: 10.6 FL (ref 9.4–12.4)
POTASSIUM SERPL-SCNC: 4.3 MEQ/L (ref 3.5–5.2)
PROT SERPL-MCNC: 6.7 G/DL (ref 6.1–8)
RBC # BLD AUTO: 4.51 MILL/MM3 (ref 4.2–5.4)
SEGMENTED NEUTROPHILS ABSOLUTE COUNT: 3.8 THOU/MM3 (ref 1.8–7.7)
SODIUM SERPL-SCNC: 143 MEQ/L (ref 135–145)
WBC # BLD AUTO: 5.3 THOU/MM3 (ref 4.8–10.8)

## 2023-02-23 DIAGNOSIS — M05.9 SEROPOSITIVE RHEUMATOID ARTHRITIS (HCC): ICD-10-CM

## 2023-02-23 RX ORDER — LEFLUNOMIDE 20 MG/1
20 TABLET ORAL DAILY
Qty: 10 TABLET | Refills: 0 | Status: SHIPPED | OUTPATIENT
Start: 2023-02-23

## 2023-02-24 DIAGNOSIS — M05.9 SEROPOSITIVE RHEUMATOID ARTHRITIS (HCC): ICD-10-CM

## 2023-02-28 RX ORDER — ABATACEPT 125 MG/ML
125 INJECTION, SOLUTION SUBCUTANEOUS
Qty: 4 ML | Refills: 5 | Status: ACTIVE | OUTPATIENT
Start: 2023-02-28

## 2023-03-01 ENCOUNTER — TELEPHONE (OUTPATIENT)
Dept: RHEUMATOLOGY | Age: 63
End: 2023-03-01

## 2023-03-01 NOTE — TELEPHONE ENCOUNTER
----- Message from CAROLINA Aguilar CNP sent at 2/23/2023  9:46 AM EST -----  Inflammatory marker is mildly elevated, there is a worsened low lymphocyte count. Please decrease the leflunomide to 10 mg daily and repeat labs in 3-4 weeks. [address] [place] [address] 
[phone] Patient: Arielle Dinero MRN: [id number] :1999 Visit Information Date & Time Provider Department Dept. Phone Encounter #  
 2018 10:15 AM Bryon Garcia, [address] [place] [address] Road [id number] Follow-up Instructions Return in about 4 weeks (around 8/3/2018), or if symptoms worsen or fail to improve. Upcoming Health Maintenance Date Due  
 HPV Age 9Y-34Y (2 of 2 - Female 2 Dose Series) 2009 Influenza Age 5 to Adult 2018 DTaP/Tdap/Td series (6 - Td) 2018 Allergies as of 2018  Review Complete On: 2018 By: Bryon Garcia MD  
 No Known Allergies Current Immunizations  Reviewed on 2016 Name Date DTaP 5/3/2006, 1998, 1997, 1997 HPV 2008, 1997, 1997 Hep A Vaccine 2008, 5/3/2006 Hep B Vaccine 2008, 1997, 1997 Influenza Vaccine (Quad) PF 10/20/2015 MMR 5/3/2006, 1998 Meningococcal (MCV4P) Vaccine 2015 Poliovirus vaccine 5/3/2006, 1998, 1997, 1997 Tdap 2008 Varicella Virus Vaccine 1998 Not reviewed this visit You Were Diagnosed With   
  
 Codes Comments Side pain    -  Primary ICD-10-CM: R10.9 ICD-9-CM: 789.00 Iron deficiency anemia due to chronic blood loss     ICD-10-CM: D50.0 ICD-9-CM: 280.0 Menstrual cramp     ICD-10-CM: N94.6 ICD-9-CM: 625.3 Screen for STD (sexually transmitted disease)     ICD-10-CM: Z11.3 ICD-9-CM: V74.5 Vitals BP Pulse Temp Resp Height(growth percentile) Weight(growth percentile) 110/80 (BP 1 Location: Left arm, BP Patient Position: Sitting) (!) 59 98.2 °F (36.8 °C) (Oral) 13 5' 3\" (1.6 m) 142 lb (64.4 kg) SpO2 BMI OB Status Smoking Status 99% 25.15 kg/m2 Having regular periods Never Smoker Vitals History BMI and BSA Data Body Mass Index Body Surface Area  
 25.15 kg/m 2 1.69 m 2 Preferred Pharmacy Pharmacy Name Phone [facility] [address] [address], [address] [phone] Your Updated Medication List  
  
   
This list is accurate as of 7/6/18 10:56 AM.  Always use your most recent med list.  
  
  
  
  
 ferrous sulfate 325 mg (65 mg iron) EC tablet Commonly known as:  IRON Take 1 Tab by mouth Daily (before breakfast). ibuprofen 800 mg tablet Commonly known as:  MOTRIN Take 1 Tab by mouth every six (6) hours as needed for Pain.  
  
 norgestimate-ethinyl estradiol 0.25-35 mg-mcg Tab Commonly known as:  [address] [facility] (28) Take 1 Tab by mouth daily. Prescriptions Sent to Pharmacy Refills  
 ibuprofen (MOTRIN) 800 mg tablet 0 Sig: Take 1 Tab by mouth every six (6) hours as needed for Pain. Class: Normal  
 Pharmacy: [facility] [address] [place] [address] AT [address] Ph #: [phone] Route: Oral  
  
We Performed the Following AMB POC URINALYSIS DIP STICK AUTO W/O MICRO [27197 CPT(R)] Follow-up Instructions Return in about 4 weeks (around 8/3/2018), or if symptoms worsen or fail to improve. To-Do List   
 07/06/2018 Lab:  CBC WITH AUTOMATED DIFF   
  
 08/06/2018 Lab:  CHLAMYDIA/GC PCR   
  
 08/06/2018 Lab:  HIV 1/2 AG/AB, 4TH GENERATION,W RFLX CONFIRM   
  
 08/06/2018 Lab:  [facility] Patient Instructions Safer Sex: Care Instructions Your Care Instructions Safer sex is a way to reduce your risk of getting an infection spread through sex. It can also help prevent pregnancy. Most infections that are spread through sex, also called sexually transmitted infections or STIs, can be cured.  But some can decrease your chances of getting pregnant if they are not treated early. Others, such as herpes, have no cure. And some, such as HIV, can be deadly. Several products can help you practice safer sex and reduce your chance of STIs. One of the best is a condom. There are condoms for men and for women. The female condom is a tube of soft plastic with a closed end that is placed deep into the vagina. You can use a special rubber sheet (dental dam) for protection during oral sex. Latex gloves can keep your hands from touching blood, semen, or other body fluids that can carry infections. Remember that birth control methods such as diaphragms, IUDs, foams, and birth control pills do not stop you from getting STIs. Follow-up care is a key part of your treatment and safety. Be sure to make and go to all appointments, and call your doctor if you are having problems. It's also a good idea to know your test results and keep a list of the medicines you take. How can you care for yourself at home? · Think about getting shots to prevent hepatitis A and hepatitis B. These two diseases can be spread through sex. You also can get hepatitis A if you eat infected food. · Use condoms or female condoms each time and every time you have sex. · Learn the right way to use a male condom: ¨ Condoms come in several sizes. Make sure you use the right size. A condom that is too small can break easily. A condom that is too big can slip off during sex. Use a new condom each time you have sex. ¨ Be careful not to poke a hole in the condom when you open the wrapper. ¨ Squeeze the tip of the condom to keep out air. ¨ Pull down the loose skin (foreskin) from the head of an uncircumcised penis. ¨ While squeezing the tip of the condom, unroll it all the way down to the base of the firm penis. ¨ Never use petroleum jelly (such as Vaseline), grease, hand lotion, baby oil, or anything with oil in it. These products can make holes in the condom. ¨ After sex, hold the condom on your penis as you remove your penis from your partner. This will keep semen from spilling out of the condom. · Learn to use a female condom: 
¨ You can put in a female condom up to 8 hours before sex. ¨ Squeeze the smaller ring at the closed end and insert it deep into the vagina. The larger ring at the open end should stay outside the vagina. ¨ During sex, make sure the penis goes into the condom. ¨ After the penis is removed, close the open end of the condom by twisting it. Remove the condom. · Do not use a female condom and male condom at the same time. · Do not have sex with anyone who has symptoms of an STI, such as sores on the genitals or mouth. The herpes virus that causes cold sores can spread to and from the penis and vagina. · Do not drink a lot of alcohol or use drugs before sex. This can cause you to let down your guard and not practice safer sex. · Having one sex partner (who does not have STIs and does not have sex with anyone else) is a sure way to avoid STIs. · Talk to your partner before you have sex. Find out if he or she has or is at risk for any STI. Keep in mind that a person may be able to spread an STI even if he or she does not have symptoms. You and your partner may want to get an HIV test. You should get tested again 6 months later. Where can you learn more? Go to http://reta-katheryn.info/. Enter D511 in the search box to learn more about \"Safer Sex: Care Instructions. \" Current as of: March 20, 2017 Content Version: 11.4 © 2848-6235 [employer]. Care instructions adapted under license by [employer] (which disclaims liability or warranty for this information). If you have questions about a medical condition or this instruction, always ask your healthcare professional. [address] any warranty or liability for your use of this information. Introducing [facility]! [facility] introduces [employer] patient portal. Now you can access parts of your medical record, email your doctor's office, and request medication refills online. 1. In your internet browser, go to [email or web address] 2. Click on the First Time User? Click Here link in the Sign In box. You will see the New Member Sign Up page. 3. Enter your [employer] Access Code exactly as it appears below. You will not need to use this code after youve completed the sign-up process. If you do not sign up before the expiration date, you must request a new code. · [employer] Access Code: [email or web address] Expires: 10/4/2018 10:55 AM 
 
4. Enter the last four digits of your Social Security Number (xxxx) and Date of Birth (mm/dd/yyyy) as indicated and click Submit. You will be taken to the next sign-up page. 5. Create a [employer] ID. This will be your [employer] login ID and cannot be changed, so think of one that is secure and easy to remember. 6. Create a [employer] password. You can change your password at any time. 7. Enter your Password Reset Question and Answer. This can be used at a later time if you forget your password. 8. Enter your e-mail address. You will receive e-mail notification when new information is available in [address] 9. Click Sign Up. You can now view and download portions of your medical record. 10. Click the Download Summary menu link to download a portable copy of your medical information. If you have questions, please visit the Frequently Asked Questions section of the [employer] website. Remember, [employer] is NOT to be used for urgent needs. For medical emergencies, dial 911. Now available from your iPhone and Android! Please provide this summary of care documentation to your next provider. Your primary care clinician is listed as Staci Pan. If you have any questions after today's visit, please call [phone].

## 2023-03-01 NOTE — TELEPHONE ENCOUNTER
Pt friend Teresa Evnas ( [employer]) called stating Silvestre Maldonado has not received her mtx. I called the pharmacy and they stated a PA is required.  I will e starting the PA

## 2023-03-01 NOTE — TELEPHONE ENCOUNTER
Called pt x3 got through at one point and started to review call lost, attempted to call x2 wouldn't go through

## 2023-03-01 NOTE — TELEPHONE ENCOUNTER
Patients friend Lyn Alejandro (on hipaa) called in regarding this. Relayed previous msg about leflunomide and repeating labs.

## 2023-03-17 ENCOUNTER — TELEPHONE (OUTPATIENT)
Dept: RHEUMATOLOGY | Age: 63
End: 2023-03-17

## 2023-03-17 NOTE — TELEPHONE ENCOUNTER
Reached out to [facility] for a PA that was started March 1. [place], representative stated the pt has medicare and it has to be processed through them. I will be reaching out to [facility] ([facility]) to see if we can get updated insurance cards. Spoke with [facility], she will be reaching out to [facility] to provide the numbers on the insurance card. She has an upcoming appointment in April. They have been advised to bring updated cards.

## 2023-03-22 ENCOUNTER — NURSE ONLY (OUTPATIENT)
Dept: LAB | Age: 63
End: 2023-03-22

## 2023-03-22 ENCOUNTER — TELEPHONE (OUTPATIENT)
Dept: RHEUMATOLOGY | Age: 63
End: 2023-03-22

## 2023-03-22 DIAGNOSIS — Z51.81 MEDICATION MONITORING ENCOUNTER: ICD-10-CM

## 2023-03-22 DIAGNOSIS — M05.9 SEROPOSITIVE RHEUMATOID ARTHRITIS (HCC): ICD-10-CM

## 2023-03-22 LAB
ALBUMIN SERPL BCG-MCNC: 3.9 G/DL (ref 3.5–5.1)
ALP SERPL-CCNC: 135 U/L (ref 38–126)
ALT SERPL W/O P-5'-P-CCNC: 11 U/L (ref 11–66)
ANION GAP SERPL CALC-SCNC: 12 MEQ/L (ref 8–16)
AST SERPL-CCNC: 15 U/L (ref 5–40)
BASOPHILS ABSOLUTE: 0 THOU/MM3 (ref 0–0.1)
BASOPHILS NFR BLD AUTO: 1.1 %
BILIRUB SERPL-MCNC: 0.4 MG/DL (ref 0.3–1.2)
BUN SERPL-MCNC: 15 MG/DL (ref 7–22)
CALCIUM SERPL-MCNC: 8.9 MG/DL (ref 8.5–10.5)
CHLORIDE SERPL-SCNC: 109 MEQ/L (ref 98–111)
CO2 SERPL-SCNC: 25 MEQ/L (ref 23–33)
CREAT SERPL-MCNC: 0.6 MG/DL (ref 0.4–1.2)
CRP SERPL-MCNC: < 0.3 MG/DL (ref 0–1)
DEPRECATED RDW RBC AUTO: 55.1 FL (ref 35–45)
EOSINOPHIL NFR BLD AUTO: 3.8 %
EOSINOPHILS ABSOLUTE: 0.2 THOU/MM3 (ref 0–0.4)
ERYTHROCYTE [DISTWIDTH] IN BLOOD BY AUTOMATED COUNT: 14.2 % (ref 11.5–14.5)
ERYTHROCYTE [SEDIMENTATION RATE] IN BLOOD BY WESTERGREN METHOD: 17 MM/HR (ref 0–20)
GFR SERPL CREATININE-BSD FRML MDRD: > 60 ML/MIN/1.73M2
GLUCOSE SERPL-MCNC: 126 MG/DL (ref 70–108)
HCT VFR BLD AUTO: 49.8 % (ref 37–47)
HGB BLD-MCNC: 15.1 GM/DL (ref 12–16)
IMM GRANULOCYTES # BLD AUTO: 0.01 THOU/MM3 (ref 0–0.07)
IMM GRANULOCYTES NFR BLD AUTO: 0.2 %
LYMPHOCYTES ABSOLUTE: 0.6 THOU/MM3 (ref 1–4.8)
LYMPHOCYTES NFR BLD AUTO: 14.4 %
MCH RBC QN AUTO: 31.5 PG (ref 26–33)
MCHC RBC AUTO-ENTMCNC: 30.3 GM/DL (ref 32.2–35.5)
MCV RBC AUTO: 104 FL (ref 81–99)
MONOCYTES ABSOLUTE: 0.5 THOU/MM3 (ref 0.4–1.3)
MONOCYTES NFR BLD AUTO: 12.4 %
NEUTROPHILS NFR BLD AUTO: 68.1 %
NRBC BLD AUTO-RTO: 0 /100 WBC
PLATELET # BLD AUTO: 265 THOU/MM3 (ref 130–400)
PMV BLD AUTO: 11.5 FL (ref 9.4–12.4)
POTASSIUM SERPL-SCNC: 4.3 MEQ/L (ref 3.5–5.2)
PROT SERPL-MCNC: 6.2 G/DL (ref 6.1–8)
RBC # BLD AUTO: 4.79 MILL/MM3 (ref 4.2–5.4)
SEGMENTED NEUTROPHILS ABSOLUTE COUNT: 3 THOU/MM3 (ref 1.8–7.7)
SODIUM SERPL-SCNC: 146 MEQ/L (ref 135–145)
WBC # BLD AUTO: 4.4 THOU/MM3 (ref 4.8–10.8)

## 2023-03-22 NOTE — TELEPHONE ENCOUNTER
----- Message from CAROLINA Jacinto CNP sent at 3/22/2023  3:17 PM EDT -----  Blood testing with mildly low white blood cell count and a stable low lymphocyte count. Liver and kidneys without abnormalities. Please stop the arava and repeat labs in 2-3 weeks.

## 2023-03-23 ENCOUNTER — TELEPHONE (OUTPATIENT)
Dept: RHEUMATOLOGY | Age: 63
End: 2023-03-23

## 2023-03-23 DIAGNOSIS — Z51.81 MEDICATION MONITORING ENCOUNTER: Primary | ICD-10-CM

## 2023-03-27 ENCOUNTER — TELEPHONE (OUTPATIENT)
Dept: RHEUMATOLOGY | Age: 63
End: 2023-03-27

## 2023-03-27 NOTE — TELEPHONE ENCOUNTER
Mihir Ramos patient home health aid called and stated the pt has taken double amount of her mtx last week. Pt was confused and tried to get herself back track and the pt ended up taking the medication herself. Mihir Ramos did state she has been vomiting and she is constipated. Mihir Ramos would like to know if this could be from mtx.

## 2023-03-28 NOTE — TELEPHONE ENCOUNTER
Mindy Simon has been called with the updated information. She will be reaching out to [facility] to deliver the message.

## 2023-04-26 NOTE — TELEPHONE ENCOUNTER
Angel Warren called requesting a refill on the following medications:  Requested Prescriptions     Pending Prescriptions Disp Refills    methotrexate (RHEUMATREX) 2.5 MG chemo tablet 28 tablet 0     Sig: Take 7 tablets by mouth once a week Take 4 tablets in the morning and 3 tablets in the evening     [facility]armacy [facility]     Date of last visit: 03/24/2022  Date of next visit (if applicable): 8/5/1136 FAMILY HISTORY:  No pertinent family history in first degree relatives

## 2023-05-11 ENCOUNTER — OFFICE VISIT (OUTPATIENT)
Dept: RHEUMATOLOGY | Age: 63
End: 2023-05-11

## 2023-05-11 ENCOUNTER — NURSE ONLY (OUTPATIENT)
Dept: LAB | Age: 63
End: 2023-05-11

## 2023-05-11 ENCOUNTER — TELEPHONE (OUTPATIENT)
Dept: RHEUMATOLOGY | Age: 63
End: 2023-05-11

## 2023-05-11 VITALS
HEIGHT: 64 IN | WEIGHT: 260 LBS | OXYGEN SATURATION: 94 % | DIASTOLIC BLOOD PRESSURE: 80 MMHG | HEART RATE: 89 BPM | SYSTOLIC BLOOD PRESSURE: 130 MMHG | BODY MASS INDEX: 44.39 KG/M2

## 2023-05-11 DIAGNOSIS — M05.9 SEROPOSITIVE RHEUMATOID ARTHRITIS (HCC): Primary | ICD-10-CM

## 2023-05-11 DIAGNOSIS — Z51.81 MEDICATION MONITORING ENCOUNTER: ICD-10-CM

## 2023-05-11 DIAGNOSIS — M15.9 OSTEOARTHRITIS OF MULTIPLE JOINTS, UNSPECIFIED OSTEOARTHRITIS TYPE: ICD-10-CM

## 2023-05-11 DIAGNOSIS — M05.9 SEROPOSITIVE RHEUMATOID ARTHRITIS (HCC): ICD-10-CM

## 2023-05-11 DIAGNOSIS — M81.0 OSTEOPOROSIS, UNSPECIFIED OSTEOPOROSIS TYPE, UNSPECIFIED PATHOLOGICAL FRACTURE PRESENCE: ICD-10-CM

## 2023-05-11 LAB
ALBUMIN SERPL BCG-MCNC: 3.3 G/DL (ref 3.5–5.1)
ALP SERPL-CCNC: 114 U/L (ref 38–126)
ALT SERPL W/O P-5'-P-CCNC: 8 U/L (ref 11–66)
ANION GAP SERPL CALC-SCNC: 11 MEQ/L (ref 8–16)
AST SERPL-CCNC: 12 U/L (ref 5–40)
BASOPHILS ABSOLUTE: 0.1 THOU/MM3 (ref 0–0.1)
BASOPHILS NFR BLD AUTO: 0.8 %
BILIRUB SERPL-MCNC: 0.3 MG/DL (ref 0.3–1.2)
BUN SERPL-MCNC: 21 MG/DL (ref 7–22)
CALCIUM SERPL-MCNC: 9.6 MG/DL (ref 8.5–10.5)
CHLORIDE SERPL-SCNC: 104 MEQ/L (ref 98–111)
CO2 SERPL-SCNC: 25 MEQ/L (ref 23–33)
CREAT SERPL-MCNC: 0.7 MG/DL (ref 0.4–1.2)
CRP SERPL-MCNC: 0.31 MG/DL (ref 0–1)
DEPRECATED RDW RBC AUTO: 62.4 FL (ref 35–45)
EOSINOPHIL NFR BLD AUTO: 2.5 %
EOSINOPHILS ABSOLUTE: 0.2 THOU/MM3 (ref 0–0.4)
ERYTHROCYTE [DISTWIDTH] IN BLOOD BY AUTOMATED COUNT: 15.2 % (ref 11.5–14.5)
ERYTHROCYTE [SEDIMENTATION RATE] IN BLOOD BY WESTERGREN METHOD: 18 MM/HR (ref 0–20)
GFR SERPL CREATININE-BSD FRML MDRD: > 60 ML/MIN/1.73M2
GLUCOSE SERPL-MCNC: 110 MG/DL (ref 70–108)
HCT VFR BLD AUTO: 44.6 % (ref 37–47)
HGB BLD-MCNC: 13.1 GM/DL (ref 12–16)
IMM GRANULOCYTES # BLD AUTO: 0.01 THOU/MM3 (ref 0–0.07)
IMM GRANULOCYTES NFR BLD AUTO: 0.2 %
LYMPHOCYTES ABSOLUTE: 0.7 THOU/MM3 (ref 1–4.8)
LYMPHOCYTES NFR BLD AUTO: 10.6 %
MACROCYTES BLD QL SMEAR: PRESENT
MCH RBC QN AUTO: 32.5 PG (ref 26–33)
MCHC RBC AUTO-ENTMCNC: 29.4 GM/DL (ref 32.2–35.5)
MCV RBC AUTO: 110.7 FL (ref 81–99)
MONOCYTES ABSOLUTE: 0.8 THOU/MM3 (ref 0.4–1.3)
MONOCYTES NFR BLD AUTO: 12.6 %
NEUTROPHILS NFR BLD AUTO: 73.3 %
NRBC BLD AUTO-RTO: 0 /100 WBC
PLATELET # BLD AUTO: 403 THOU/MM3 (ref 130–400)
PMV BLD AUTO: 10.5 FL (ref 9.4–12.4)
POTASSIUM SERPL-SCNC: 4.5 MEQ/L (ref 3.5–5.2)
PROT SERPL-MCNC: 6.2 G/DL (ref 6.1–8)
RBC # BLD AUTO: 4.03 MILL/MM3 (ref 4.2–5.4)
SEGMENTED NEUTROPHILS ABSOLUTE COUNT: 4.7 THOU/MM3 (ref 1.8–7.7)
SODIUM SERPL-SCNC: 140 MEQ/L (ref 135–145)
WBC # BLD AUTO: 6.4 THOU/MM3 (ref 4.8–10.8)

## 2023-05-11 RX ORDER — LEFLUNOMIDE 10 MG/1
10 TABLET ORAL DAILY
Qty: 30 TABLET | Refills: 0 | Status: SHIPPED | OUTPATIENT
Start: 2023-05-11

## 2023-05-11 ASSESSMENT — ENCOUNTER SYMPTOMS
EYE ITCHING: 0
DIARRHEA: 0
ABDOMINAL PAIN: 0
NAUSEA: 0
CONSTIPATION: 0
COUGH: 0
BACK PAIN: 0
EYE PAIN: 0
SHORTNESS OF BREATH: 0
TROUBLE SWALLOWING: 0

## 2023-05-11 NOTE — TELEPHONE ENCOUNTER
----- Message from CAROLINA Castillo CNP sent at 5/11/2023  4:05 PM EDT -----  Blood testing with mildly low lymphocyte count. The total white blood cell count has resolved. Please continue the methotrexate and restart the arava at 10 mg daily and repeat labs in 4 weeks.

## 2023-05-11 NOTE — PROGRESS NOTES
[facility] RHEUMATOLOGY FOLLOW UP NOTE       Date Of Service: 5/11/2023  Provider: CAROLINA Yousif - CNP    Name: Raymundo Barrera   MRN: [id number]    Chief Complaint(s)     Chief Complaint   Patient presents with    Follow-up     3 month follow up        History of Present Illness (HPI)     Raymundo Barrera  is a(n)63 y.o. female with a hx of anemia, rheumatoid arthritis, atrial fibrillation with RVR, bladder incontinence, depression, GERD, hypokalemia, hypermobility syndrome, major depression disorder, migraines morbid obesity, [place] on CPAP, osteoarthritis, tibial plateau fracture, osteoporosis  here for the f/u evaluation of rheumatoid arthritis     Interval hx:    - accidentally took double dose of methotrexate during a week in march- had vomiting and diarrhea. Leukopenia on labs during that time. Betsy Bedolla was held prior to knowing about the methotrexate dosing. pain affecting the right fingers  Pain on a scale 0-10: 4/10  Type of pain: constant  Timing: mornings  Aggravating factors: touching the joints  Alleviating factors: excedrin    Associated symptoms:  denies swelling/  Redness/ warmth, + AM stiffness lasting 1 hour    REVIEW OF SYSTEMS: (ROS)    Review of Systems   Constitutional:  Positive for fatigue. Negative for fever and unexpected weight change. HENT:  Negative for congestion and trouble swallowing. Eyes:  Negative for pain and itching. Dry eyes   Respiratory:  Negative for cough and shortness of breath. Cardiovascular:  Negative for chest pain and leg swelling. Gastrointestinal:  Negative for abdominal pain, constipation, diarrhea and nausea. Endocrine: Negative for cold intolerance and heat intolerance. Genitourinary:  Negative for difficulty urinating, frequency and urgency. Musculoskeletal:  Positive for arthralgias. Negative for back pain, joint swelling, myalgias and neck pain. Skin:  Negative for rash and wound.    Neurological:  Positive for weakness

## 2023-05-17 ENCOUNTER — TELEPHONE (OUTPATIENT)
Dept: RHEUMATOLOGY | Age: 63
End: 2023-05-17

## 2023-05-17 NOTE — TELEPHONE ENCOUNTER
Received a letter for Gordon Roberts. Stated the pt was supplied a temporary supply of the medication. PA was required. Pt has been approved: [id number]; Review Type:Prior Auth; Coverage Start Date:04/17/2023; Coverage End Date:12/31/2023;

## 2023-06-08 ENCOUNTER — NURSE ONLY (OUTPATIENT)
Dept: LAB | Age: 63
End: 2023-06-08

## 2023-06-08 DIAGNOSIS — Z51.81 MEDICATION MONITORING ENCOUNTER: ICD-10-CM

## 2023-06-08 DIAGNOSIS — M05.9 SEROPOSITIVE RHEUMATOID ARTHRITIS (HCC): ICD-10-CM

## 2023-06-08 DIAGNOSIS — R79.89 LFT ELEVATION: Primary | ICD-10-CM

## 2023-06-08 LAB
ALBUMIN SERPL BCG-MCNC: 3.3 G/DL (ref 3.5–5.1)
ALP SERPL-CCNC: 161 U/L (ref 38–126)
ALT SERPL W/O P-5'-P-CCNC: 37 U/L (ref 11–66)
ANION GAP SERPL CALC-SCNC: 12 MEQ/L (ref 8–16)
AST SERPL-CCNC: 42 U/L (ref 5–40)
BASOPHILS ABSOLUTE: 0 THOU/MM3 (ref 0–0.1)
BASOPHILS NFR BLD AUTO: 0.4 %
BILIRUB SERPL-MCNC: 0.4 MG/DL (ref 0.3–1.2)
BUN SERPL-MCNC: 22 MG/DL (ref 7–22)
CALCIUM SERPL-MCNC: 9.2 MG/DL (ref 8.5–10.5)
CHLORIDE SERPL-SCNC: 106 MEQ/L (ref 98–111)
CO2 SERPL-SCNC: 22 MEQ/L (ref 23–33)
CREAT SERPL-MCNC: 0.6 MG/DL (ref 0.4–1.2)
CRP SERPL-MCNC: < 0.3 MG/DL (ref 0–1)
DEPRECATED RDW RBC AUTO: 55.7 FL (ref 35–45)
EOSINOPHIL NFR BLD AUTO: 2.2 %
EOSINOPHILS ABSOLUTE: 0.1 THOU/MM3 (ref 0–0.4)
ERYTHROCYTE [DISTWIDTH] IN BLOOD BY AUTOMATED COUNT: 14.3 % (ref 11.5–14.5)
ERYTHROCYTE [SEDIMENTATION RATE] IN BLOOD BY WESTERGREN METHOD: 4 MM/HR (ref 0–20)
GFR SERPL CREATININE-BSD FRML MDRD: > 60 ML/MIN/1.73M2
GLUCOSE SERPL-MCNC: 163 MG/DL (ref 70–108)
HCT VFR BLD AUTO: 47.7 % (ref 37–47)
HGB BLD-MCNC: 14.4 GM/DL (ref 12–16)
IMM GRANULOCYTES # BLD AUTO: 0.01 THOU/MM3 (ref 0–0.07)
IMM GRANULOCYTES NFR BLD AUTO: 0.2 %
LYMPHOCYTES ABSOLUTE: 0.6 THOU/MM3 (ref 1–4.8)
LYMPHOCYTES NFR BLD AUTO: 12.4 %
MCH RBC QN AUTO: 32.1 PG (ref 26–33)
MCHC RBC AUTO-ENTMCNC: 30.2 GM/DL (ref 32.2–35.5)
MCV RBC AUTO: 106.2 FL (ref 81–99)
MONOCYTES ABSOLUTE: 0.3 THOU/MM3 (ref 0.4–1.3)
MONOCYTES NFR BLD AUTO: 6.7 %
NEUTROPHILS NFR BLD AUTO: 78.1 %
NRBC BLD AUTO-RTO: 0 /100 WBC
PLATELET # BLD AUTO: 290 THOU/MM3 (ref 130–400)
PMV BLD AUTO: 11.5 FL (ref 9.4–12.4)
POTASSIUM SERPL-SCNC: 4.6 MEQ/L (ref 3.5–5.2)
PROT SERPL-MCNC: 5.9 G/DL (ref 6.1–8)
RBC # BLD AUTO: 4.49 MILL/MM3 (ref 4.2–5.4)
SEGMENTED NEUTROPHILS ABSOLUTE COUNT: 4 THOU/MM3 (ref 1.8–7.7)
SODIUM SERPL-SCNC: 140 MEQ/L (ref 135–145)
WBC # BLD AUTO: 5.1 THOU/MM3 (ref 4.8–10.8)

## 2023-06-19 ENCOUNTER — TELEPHONE (OUTPATIENT)
Dept: RHEUMATOLOGY | Age: 63
End: 2023-06-19

## 2023-06-19 NOTE — TELEPHONE ENCOUNTER
Patients friend Mauricio Adame (on hippa) calling in for patient. She is asking for the standing lab order to be faxed to [address] [facility] in [facility], she only had phone # [phone]. Please advise.

## 2023-07-06 ENCOUNTER — TELEPHONE (OUTPATIENT)
Dept: RHEUMATOLOGY | Age: 63
End: 2023-07-06

## 2023-07-06 NOTE — TELEPHONE ENCOUNTER
Please fax labs orders over to [address], [place] The patient is sitting at the lab now!     Fax [phone]

## 2023-07-12 ENCOUNTER — TELEPHONE (OUTPATIENT)
Dept: RHEUMATOLOGY | Age: 63
End: 2023-07-12

## 2023-07-12 DIAGNOSIS — M05.9 SEROPOSITIVE RHEUMATOID ARTHRITIS (HCC): ICD-10-CM

## 2023-07-12 NOTE — TELEPHONE ENCOUNTER
Jennifer Alcaraz called in wanting to see if patient is supposed to be on the MTX. Informed patient that it looks like it but wasn't 100% sure as it hasn't been filled since 5/11 and wanted to clarify with the providers. Stated understanding. Please advise.      Stated to call after 10 AM

## 2023-07-12 NOTE — TELEPHONE ENCOUNTER
Bethany Pereira is calling back to verify when patient is needing to have her labs done next so she can make sure she has transportation coordinated.

## 2023-07-12 NOTE — TELEPHONE ENCOUNTER
----- Message from Marilin High DO sent at 7/10/2023  5:06 PM EDT -----  The liver function tests have returned to within normal limits.